# Patient Record
Sex: FEMALE | Race: WHITE | Employment: FULL TIME | URBAN - METROPOLITAN AREA
[De-identification: names, ages, dates, MRNs, and addresses within clinical notes are randomized per-mention and may not be internally consistent; named-entity substitution may affect disease eponyms.]

---

## 2017-06-16 ENCOUNTER — HOSPITAL ENCOUNTER (OUTPATIENT)
Dept: MAMMOGRAPHY | Age: 52
Discharge: HOME | End: 2017-06-16
Attending: SURGERY | Admitting: SURGERY

## 2017-06-16 DIAGNOSIS — Z12.31 ENCOUNTER FOR SCREENING MAMMOGRAM FOR MALIGNANT NEOPLASM OF BREAST: ICD-10-CM

## 2017-08-04 ENCOUNTER — HOSPITAL ENCOUNTER (OUTPATIENT)
Dept: SURGERY | Age: 52
Discharge: HOME | End: 2017-08-04
Attending: INTERNAL MEDICINE | Admitting: INTERNAL MEDICINE

## 2017-08-04 VITALS
TEMPERATURE: 97.2 F | DIASTOLIC BLOOD PRESSURE: 80 MMHG | OXYGEN SATURATION: 94 % | RESPIRATION RATE: 16 BRPM | WEIGHT: 255 LBS | HEIGHT: 65 IN | HEART RATE: 92 BPM | SYSTOLIC BLOOD PRESSURE: 122 MMHG | BODY MASS INDEX: 42.49 KG/M2

## 2017-08-04 RX ORDER — SODIUM CHLORIDE, SODIUM LACTATE, POTASSIUM CHLORIDE, CALCIUM CHLORIDE 600; 310; 30; 20 MG/100ML; MG/100ML; MG/100ML; MG/100ML
INJECTION, SOLUTION INTRAVENOUS CONTINUOUS
Status: DISCONTINUED | OUTPATIENT
Start: 2017-08-04 | End: 2017-08-05 | Stop reason: HOSPADM

## 2017-08-04 RX ORDER — LIDOCAINE HYDROCHLORIDE 10 MG/ML
0.1 INJECTION, SOLUTION EPIDURAL; INFILTRATION; INTRACAUDAL; PERINEURAL ONCE
Status: DISCONTINUED | OUTPATIENT
Start: 2017-08-04 | End: 2017-08-05 | Stop reason: HOSPADM

## 2017-08-04 RX ORDER — DOXEPIN HYDROCHLORIDE 50 MG/1
50 CAPSULE ORAL NIGHTLY
COMMUNITY

## 2017-08-04 RX ADMIN — SODIUM CHLORIDE, SODIUM LACTATE, POTASSIUM CHLORIDE, CALCIUM CHLORIDE: 600; 310; 30; 20 INJECTION, SOLUTION INTRAVENOUS at 09:21

## 2017-08-04 ASSESSMENT — PAIN - FUNCTIONAL ASSESSMENT: PAIN_FUNCTIONAL_ASSESSMENT: 0-10

## 2017-11-03 ENCOUNTER — HOSPITAL ENCOUNTER (OUTPATIENT)
Dept: OTHER | Age: 52
Discharge: HOME | End: 2017-11-03
Attending: OBSTETRICS & GYNECOLOGY | Admitting: OBSTETRICS & GYNECOLOGY

## 2017-11-03 ENCOUNTER — PAT TELEPHONE (OUTPATIENT)
Dept: PREADMISSION TESTING | Age: 52
End: 2017-11-03

## 2017-11-03 VITALS — HEIGHT: 65 IN | BODY MASS INDEX: 41.65 KG/M2 | WEIGHT: 250 LBS

## 2017-11-03 DIAGNOSIS — Z85.3 PERSONAL HISTORY OF BREAST CANCER: ICD-10-CM

## 2017-11-03 LAB
A/G RATIO: 1.2 (ref 1.1–2.2)
ALBUMIN SERPL-MCNC: 4.3 G/DL (ref 3.4–5)
ALP BLD-CCNC: 94 U/L (ref 40–129)
ALT SERPL-CCNC: 14 U/L (ref 10–40)
ANION GAP SERPL CALCULATED.3IONS-SCNC: 15 MMOL/L (ref 3–16)
AST SERPL-CCNC: 16 U/L (ref 15–37)
BASOPHILS ABSOLUTE: 0.1 K/UL (ref 0–0.2)
BASOPHILS RELATIVE PERCENT: 1 %
BILIRUB SERPL-MCNC: 0.6 MG/DL (ref 0–1)
BUN BLDV-MCNC: 15 MG/DL (ref 7–20)
CALCIUM SERPL-MCNC: 10 MG/DL (ref 8.3–10.6)
CHLORIDE BLD-SCNC: 97 MMOL/L (ref 99–110)
CO2: 26 MMOL/L (ref 21–32)
CREAT SERPL-MCNC: 1 MG/DL (ref 0.6–1.1)
EKG ATRIAL RATE: 70 BPM
EKG DIAGNOSIS: NORMAL
EKG P AXIS: 12 DEGREES
EKG P-R INTERVAL: 158 MS
EKG Q-T INTERVAL: 408 MS
EKG QRS DURATION: 128 MS
EKG QTC CALCULATION (BAZETT): 440 MS
EKG R AXIS: -32 DEGREES
EKG T AXIS: -19 DEGREES
EKG VENTRICULAR RATE: 70 BPM
EOSINOPHILS ABSOLUTE: 0.2 K/UL (ref 0–0.6)
EOSINOPHILS RELATIVE PERCENT: 1.8 %
GFR AFRICAN AMERICAN: >60
GFR NON-AFRICAN AMERICAN: 58
GLOBULIN: 3.7 G/DL
GLUCOSE BLD-MCNC: 74 MG/DL (ref 70–99)
HCT VFR BLD CALC: 40.8 % (ref 36–48)
HEMOGLOBIN: 13.5 G/DL (ref 12–16)
LYMPHOCYTES ABSOLUTE: 1.6 K/UL (ref 1–5.1)
LYMPHOCYTES RELATIVE PERCENT: 19.7 %
MCH RBC QN AUTO: 30 PG (ref 26–34)
MCHC RBC AUTO-ENTMCNC: 33 G/DL (ref 31–36)
MCV RBC AUTO: 90.7 FL (ref 80–100)
MONOCYTES ABSOLUTE: 0.6 K/UL (ref 0–1.3)
MONOCYTES RELATIVE PERCENT: 7.6 %
NEUTROPHILS ABSOLUTE: 5.8 K/UL (ref 1.7–7.7)
NEUTROPHILS RELATIVE PERCENT: 69.9 %
PDW BLD-RTO: 14.5 % (ref 12.4–15.4)
PLATELET # BLD: 214 K/UL (ref 135–450)
PMV BLD AUTO: 8.3 FL (ref 5–10.5)
POTASSIUM SERPL-SCNC: 4.2 MMOL/L (ref 3.5–5.1)
RBC # BLD: 4.5 M/UL (ref 4–5.2)
SODIUM BLD-SCNC: 138 MMOL/L (ref 136–145)
TOTAL PROTEIN: 8 G/DL (ref 6.4–8.2)
WBC # BLD: 8.3 K/UL (ref 4–11)

## 2017-11-03 PROCEDURE — 93010 ELECTROCARDIOGRAM REPORT: CPT | Performed by: INTERNAL MEDICINE

## 2017-11-09 ENCOUNTER — HOSPITAL ENCOUNTER (OUTPATIENT)
Dept: SURGERY | Age: 52
Discharge: HOME | End: 2017-11-09
Attending: OBSTETRICS & GYNECOLOGY | Admitting: OBSTETRICS & GYNECOLOGY

## 2017-11-09 VITALS
BODY MASS INDEX: 42.32 KG/M2 | SYSTOLIC BLOOD PRESSURE: 120 MMHG | WEIGHT: 254 LBS | HEIGHT: 65 IN | RESPIRATION RATE: 16 BRPM | OXYGEN SATURATION: 97 % | TEMPERATURE: 97 F | HEART RATE: 77 BPM | DIASTOLIC BLOOD PRESSURE: 63 MMHG

## 2017-11-09 LAB
GLUCOSE BLD-MCNC: 108 MG/DL (ref 70–99)
GLUCOSE BLD-MCNC: 94 MG/DL (ref 70–99)
HCT VFR BLD CALC: 36.4 % (ref 36–48)
HCT VFR BLD CALC: 38.3 % (ref 36–48)
HEMOGLOBIN: 12.1 G/DL (ref 12–16)
HEMOGLOBIN: 12.4 G/DL (ref 12–16)
PERFORMED ON: ABNORMAL
PERFORMED ON: NORMAL

## 2017-11-09 RX ORDER — LIDOCAINE HYDROCHLORIDE 10 MG/ML
1 INJECTION, SOLUTION EPIDURAL; INFILTRATION; INTRACAUDAL; PERINEURAL
Status: COMPLETED | OUTPATIENT
Start: 2017-11-09 | End: 2017-11-09

## 2017-11-09 RX ORDER — HYDROCODONE BITARTRATE AND ACETAMINOPHEN 5; 325 MG/1; MG/1
1 TABLET ORAL EVERY 6 HOURS PRN
Qty: 28 TABLET | Refills: 0 | Status: SHIPPED | OUTPATIENT
Start: 2017-11-09 | End: 2017-11-16

## 2017-11-09 RX ORDER — DIPHENHYDRAMINE HYDROCHLORIDE 50 MG/ML
12.5 INJECTION INTRAMUSCULAR; INTRAVENOUS
Status: ACTIVE | OUTPATIENT
Start: 2017-11-09 | End: 2017-11-09

## 2017-11-09 RX ORDER — HYDRALAZINE HYDROCHLORIDE 20 MG/ML
5 INJECTION INTRAMUSCULAR; INTRAVENOUS EVERY 10 MIN PRN
Status: DISCONTINUED | OUTPATIENT
Start: 2017-11-09 | End: 2017-11-10 | Stop reason: HOSPADM

## 2017-11-09 RX ORDER — SODIUM CHLORIDE, SODIUM LACTATE, POTASSIUM CHLORIDE, CALCIUM CHLORIDE 600; 310; 30; 20 MG/100ML; MG/100ML; MG/100ML; MG/100ML
INJECTION, SOLUTION INTRAVENOUS CONTINUOUS
Status: DISCONTINUED | OUTPATIENT
Start: 2017-11-09 | End: 2017-11-10 | Stop reason: HOSPADM

## 2017-11-09 RX ORDER — MEPERIDINE HYDROCHLORIDE 50 MG/ML
12.5 INJECTION INTRAMUSCULAR; INTRAVENOUS; SUBCUTANEOUS EVERY 5 MIN PRN
Status: DISCONTINUED | OUTPATIENT
Start: 2017-11-09 | End: 2017-11-10 | Stop reason: HOSPADM

## 2017-11-09 RX ORDER — SODIUM CHLORIDE 0.9 % (FLUSH) 0.9 %
10 SYRINGE (ML) INJECTION PRN
Status: DISCONTINUED | OUTPATIENT
Start: 2017-11-09 | End: 2017-11-10 | Stop reason: HOSPADM

## 2017-11-09 RX ORDER — PROMETHAZINE HYDROCHLORIDE 25 MG/ML
6.25 INJECTION, SOLUTION INTRAMUSCULAR; INTRAVENOUS
Status: ACTIVE | OUTPATIENT
Start: 2017-11-09 | End: 2017-11-09

## 2017-11-09 RX ORDER — OXYCODONE HYDROCHLORIDE 5 MG/1
5 TABLET ORAL PRN
Status: ACTIVE | OUTPATIENT
Start: 2017-11-09 | End: 2017-11-09

## 2017-11-09 RX ORDER — IBUPROFEN 800 MG/1
800 TABLET ORAL EVERY 6 HOURS PRN
Qty: 30 TABLET | Refills: 1 | Status: ON HOLD | OUTPATIENT
Start: 2017-11-09 | End: 2017-12-07 | Stop reason: DRUGHIGH

## 2017-11-09 RX ORDER — OXYCODONE HYDROCHLORIDE 5 MG/1
10 TABLET ORAL PRN
Status: ACTIVE | OUTPATIENT
Start: 2017-11-09 | End: 2017-11-09

## 2017-11-09 RX ORDER — METOCLOPRAMIDE HYDROCHLORIDE 5 MG/ML
10 INJECTION INTRAMUSCULAR; INTRAVENOUS
Status: ACTIVE | OUTPATIENT
Start: 2017-11-09 | End: 2017-11-09

## 2017-11-09 RX ORDER — SODIUM CHLORIDE 0.9 % (FLUSH) 0.9 %
10 SYRINGE (ML) INJECTION EVERY 12 HOURS SCHEDULED
Status: DISCONTINUED | OUTPATIENT
Start: 2017-11-09 | End: 2017-11-10 | Stop reason: HOSPADM

## 2017-11-09 RX ORDER — MORPHINE SULFATE 2 MG/ML
1 INJECTION, SOLUTION INTRAMUSCULAR; INTRAVENOUS EVERY 5 MIN PRN
Status: DISCONTINUED | OUTPATIENT
Start: 2017-11-09 | End: 2017-11-10 | Stop reason: HOSPADM

## 2017-11-09 RX ORDER — LABETALOL HYDROCHLORIDE 5 MG/ML
5 INJECTION, SOLUTION INTRAVENOUS EVERY 10 MIN PRN
Status: DISCONTINUED | OUTPATIENT
Start: 2017-11-09 | End: 2017-11-10 | Stop reason: HOSPADM

## 2017-11-09 RX ADMIN — SODIUM CHLORIDE, SODIUM LACTATE, POTASSIUM CHLORIDE, CALCIUM CHLORIDE: 600; 310; 30; 20 INJECTION, SOLUTION INTRAVENOUS at 12:43

## 2017-11-09 RX ADMIN — Medication 0.5 MG: at 15:34

## 2017-11-09 RX ADMIN — LIDOCAINE HYDROCHLORIDE 1 ML: 10 INJECTION, SOLUTION EPIDURAL; INFILTRATION; INTRACAUDAL; PERINEURAL at 12:43

## 2017-11-09 ASSESSMENT — PAIN SCALES - GENERAL
PAINLEVEL_OUTOF10: 7
PAINLEVEL_OUTOF10: 0

## 2017-11-09 ASSESSMENT — PAIN - FUNCTIONAL ASSESSMENT: PAIN_FUNCTIONAL_ASSESSMENT: 0-10

## 2017-11-09 NOTE — H&P
H&P reviewed and patient examined, no changes noted. I have reviewed the risks, benefits and alternatives to the procedure.   Emy Gaspar

## 2017-11-09 NOTE — PROGRESS NOTES
Findings at OR reviewed with patient in PACU.; Currently she only c/o mild abdominal cramping. No vaginal bleeding. VSS remain stable. H/H = 12.4/38.3. Will continue to observe in PACU and repeat H/H at 1800. If stable, will plan for discharge to home. If dropping, possible L/S later today. Pt will need hysterectomy in near future due to PMB bleeding, hx breast Ca, thickened EM lining, and inability to sample endometrium.     Dona Witt MD

## 2017-11-09 NOTE — OP NOTE
Department of Gynecology  Brief Operative Report        Pre-operative Diagnosis:  Postmenopausal bleeding, thickened endometrial lining, non-diagnostic endometrial biopsy    Post-operative Diagnosis:  same    Procedure: Attempted diagnostic hysteroscopy    Surgeon:  Masood Chopra      Assistant(s):      Anesthesia:  General Laryngeal Mask Airway Anesthesia    Findings:  Stenotic cervix    Estimated blood loss:  less than 50ml    Blood Transfusion?:  No     Specimens:  none    Complications:  Suspected uterine perforation    Condition:  good    See dictated operative report for full details.

## 2017-11-09 NOTE — PROGRESS NOTES
Received pt from OR awake, alert, oriented. VSS at present. Pain level minimal. Peripad in place. No drainage on peripad. Dr. Javier Stewart here. Stat H & H ordered.  Will monitor

## 2017-11-09 NOTE — ANESTHESIA PRE-OP
Department of Anesthesiology  Preprocedure Note       Name:  Salima Benavides   Age:  45 y.o.  :  1965                                          MRN:  [id number]         Date:  2017      Surgeon: Ervin Khanna    Procedure: Hysteroscopy; D&C; Endometrial Ablation    Medications prior to admission:   Prior to Admission medications    Medication Sig Start Date End Date Taking? Authorizing Provider   doxepin (SINEQUAN) 50 MG capsule Take 50 mg by mouth nightly   Yes Historical Provider, MD   carvedilol (COREG) 25 MG tablet Take 50 mg by mouth 2 times daily  2/19/15  Yes Historical Provider, MD   lisinopril (PRINIVIL;ZESTRIL) 5 MG tablet Take 5 mg by mouth. 2/6/15  Yes Historical Provider, MD   magnesium oxide (MAGOX 400) 400 (241.3 MG) MG TABS tablet Take 400 mg by mouth 2 times daily  2/6/15  Yes Historical Provider, MD   pravastatin (PRAVACHOL) 40 MG tablet Take 40 mg by mouth. 2/6/15  Yes Historical Provider, MD   furosemide (LASIX) 20 MG tablet Take 20 mg by mouth three times a week Indications: takes on mon,wed,fri    Yes Historical Provider, MD   spironolactone (ALDACTONE) 25 MG tablet Take 25 mg by mouth 16   Historical Provider, MD   CALCIUM CARBONATE PO Take by mouth daily  11/3/10   Historical Provider, MD       Current medications:    Current Outpatient Prescriptions   Medication Sig Dispense Refill    doxepin (SINEQUAN) 50 MG capsule Take 50 mg by mouth nightly      carvedilol (COREG) 25 MG tablet Take 50 mg by mouth 2 times daily       lisinopril (PRINIVIL;ZESTRIL) 5 MG tablet Take 5 mg by mouth.  magnesium oxide (MAGOX 400) 400 (241.3 MG) MG TABS tablet Take 400 mg by mouth 2 times daily       pravastatin (PRAVACHOL) 40 MG tablet Take 40 mg by mouth.       furosemide (LASIX) 20 MG tablet Take 20 mg by mouth three times a week Indications: takes on mon,wed,fri       spironolactone (ALDACTONE) 25 MG tablet Take 25 mg by mouth      CALCIUM CARBONATE PO Take by mouth daily        Current Facility-Administered Medications   Medication Dose Route Frequency Provider Last Rate Last Dose    lactated ringers infusion   Intravenous Continuous Daryl Tracey  mL/hr at 17 1243      sodium chloride flush 0.9 % injection 10 mL  10 mL Intravenous 2 times per day Daryl Tracey MD        sodium chloride flush 0.9 % injection 10 mL  10 mL Intravenous PRN Daryl Tracey MD        ceFAZolin (ANCEF) 2 g in sterile water 20 mL IV syringe  2 g Intravenous On Call to [address] [place] [address], MD           Allergies:     Allergies   Allergen Reactions    Azithromycin Other (See Comments)     \"BP dropped\"       Problem List:    Patient Active Problem List   Diagnosis Code    Personal history of breast cancer Z85.3    History of antineoplastic chemotherapy Z92.21    Cardiomyopathy due to chemotherapy ([facility] I42.7, T45.1X5A    Family history of breast cancer Z80.3    Family history of ovarian cancer Z80.41    Knee strain S86.919A    Right knee pain M25.561       Past Medical History:        Diagnosis Date    Allergic     Arthritis     Breast cancer ([facility] [address]     , Radiation, Chemo    CAD (coronary artery disease)     cardiomyopathy    CHF (congestive heart failure) ([facility] [facility]     Diabetes mellitus ([facility] [address] [facility]     No medications    Heart disease     Hx of blood clots     blood clot at PICC line site    PONV (postoperative nausea and vomiting)     nausea; no vomiting       Past Surgical History:        Procedure Laterality Date    BREAST LUMPECTOMY Left     lymph node dissection     CARDIAC DEFIBRILLATOR PLACEMENT Left      SECTION      COLONOSCOPY  2017    LYMPH NODE DISSECTION         Social History:    Social History   Substance Use Topics    Smoking status: Never Smoker    Smokeless tobacco: Never Used    Alcohol use 1.2 oz/week     2 Glasses of wine per week      Comment: 2-3 drinks per week                                Counseling given: Not

## 2017-11-10 NOTE — PROGRESS NOTES
Dr Michael Cook called and updated about repeat H&H; states okay for pt to go home and he will followup tomorrow

## 2017-11-10 NOTE — OP NOTE
[facility]                   [facility]                                  OPERATIVE REPORT    PATIENT NAME: Wing Stoddard                       :        1965  MED REC NO:   [id number]                          ROOM:  ACCOUNT NO:   [de-identified]                          ADMIT DATE: 2017  PROVIDER:     Marshall Leiva MD    DATE OF PROCEDURE:  2017    PROCEDURE:  Attempted diagnostic hysteroscopy. PREOPERATIVE DIAGNOSES:  Postmenopausal bleeding, thickened endometrial  lining and nondiagnostic endometrial biopsy. POSTOPERATIVE DIAGNOSES:  Postmenopausal bleeding, thickened endometrial  lining and nondiagnostic endometrial biopsy. SURGEON:  Marshall Leiva MD    ANESTHESIA:  General with LMA. INDICATIONS AND CONSENT FOR THE PROCEDURE:  The patient is a 66-year-old   2, para 2 female, who presented to the office in 10/2017 for her  annual examination, complaining of bleeding for approximately one week that  started on 2017. Prior to that, her last period was 14 years ago. She does have a history of breast cancer and has a history of lumpectomy in  . She has chronic hypertension and type 2 diabetes as well. The  patient's breast cancer history, she was treated with a chemotherapy,  radiation therapy and lumpectomy, her tumor was a triple negative tumor. The patient was at risk for endometrial pathology, she had a Pap smear  done, which was normal.  She had an ultrasound done which showed  nonvisualization of the ovaries; however, the endometrial lining appeared  to be thickened at 6.4 mm without any evidence of a focal lesion. The  patient had an attempted endometrial biopsy performed; however, she had a  large amount of cervical stenosis, a specimen was obtained and just  returned showing mucus and fragments of benign endocervical glands, but no  endometrial tissue.   The patient was counseled as to need for operative  management and diagnostic hysteroscopy with dilation and curettage. She  was aware of risks of surgery including anesthesia, infection, injury to  bowel or urinary tract, hemorrhage requiring transfusion and risk of HIV or  hepatitis and possible need for further surgery. She understood these  risks and signed an informed consent. DESCRIPTION OF PROCEDURE:  With the patient under satisfactory general  anesthesia, she was placed in the dorsal lithotomy position in the  candy-cane stirrups. A careful examination under anesthesia was performed  revealing no abnormalities. The vagina and perineum were prepped and  draped in usual sterile fashion and a catheter was used to empty the  bladder. Alegria retractors were placed anteriorly and posteriorly in the  vaginal vault and the cervix was visualized with some difficulty due to the  cervix being essentially flushed with the upper vagina. Once the cervix  was visualized, it was grasped with an Allis clamp and then using an  uterine sound, attempts were made to sound the uterus. The sound went in  approximately 5 cm and then resistance was met. At this point, using Hegar  dilators, the cervix was dilated and then using the hysteroscope attempts  were made hydrodissection to go past the cervical stenosis and get into the  uterine cavity. No uterine cavity was identified; however, it appeared  that the uterine cavity was just beyond the reach of the hysteroscope. Further dilation was done with the dilators and then the hysteroscope was  reinserted and at this point, there was less resistance to entering and the  hysteroscope was advanced. However, at this point there was a loss of  fluid pressure based on the attached fluid recovery system and the  hysteroscope visualized that looked to be contents of the peritoneal  cavity.   The hysteroscope was immediately withdrawn and it was immediately  apparent that there was a perforation to the posterior wall of the uterus. The area was observed for approximately 10 minutes in the OR and there was  no bleeding noted from this. At this point, the procedure was terminated. The Allis clamp was removed and there was just a small amount of bleeding  from the Allis clamp site. This was controlled with direct pressure. The  patient's vital signs were stable in the OR. At this point, the patient  was then awakened and taken to recovery room in satisfactory condition. All sponge counts were correct and no complications. Estimated blood loss  was less than 50 mL and fluid replacement was with crystalloid. There was  no fluid deficit of any significance. The patient will be observed in the  recovery room on same day surgery, stat hemoglobin and hematocrit will be  obtained on arrival to the recovery room and then will be repeated three  hours later. If it remains stable, the patient will be discharged to  followup closely in the office with signs and symptoms to review for  infection. If the patient's hemoglobin and hematocrit are dropping, then  consideration will be made to return to the OR later today to explore this  for a vascular injury, although this is not suspected at the time of the  completion of procedure.         Sharri Franco MD    D: 11/09/2017 15:16:03       T: 11/09/2017 21:24:44     [id number]  Job#: [id number]     Doc#: [id number]

## 2017-11-10 NOTE — PROGRESS NOTES
Discharge instructions reviewed with patient/responsible adult and understanding verbalized. Discharge instructions signed and copies given. Patient discharged home with belongings. Rx for Norco and Ibuprofen given.  WC to car

## 2017-12-07 PROBLEM — N95.0 POSTMENOPAUSAL BLEEDING: Status: ACTIVE | Noted: 2017-12-07

## 2017-12-09 PROBLEM — N95.0 POSTMENOPAUSAL BLEEDING: Status: RESOLVED | Noted: 2017-12-07 | Resolved: 2017-12-09

## 2018-10-08 ENCOUNTER — HOSPITAL ENCOUNTER (OUTPATIENT)
Dept: WOMENS IMAGING | Age: 53
Discharge: HOME | End: 2018-10-08
Payer: COMMERCIAL

## 2018-10-08 DIAGNOSIS — Z78.0 POSTMENOPAUSAL STATE: ICD-10-CM

## 2018-10-08 PROCEDURE — 77080 DXA BONE DENSITY AXIAL: CPT

## 2019-08-16 ENCOUNTER — HOSPITAL ENCOUNTER (OUTPATIENT)
Dept: WOMENS IMAGING | Age: 54
Discharge: HOME | End: 2019-08-16
Payer: COMMERCIAL

## 2019-08-16 DIAGNOSIS — Z12.31 ENCOUNTER FOR SCREENING MAMMOGRAM FOR MALIGNANT NEOPLASM OF BREAST: ICD-10-CM

## 2019-08-16 DIAGNOSIS — N64.59 BLEEDING FROM NIPPLE IN FEMALE: ICD-10-CM

## 2019-08-16 PROCEDURE — 77067 SCR MAMMO BI INCL CAD: CPT

## 2019-08-16 PROCEDURE — 76642 ULTRASOUND BREAST LIMITED: CPT

## 2020-08-21 ENCOUNTER — HOSPITAL ENCOUNTER (OUTPATIENT)
Dept: WOMENS IMAGING | Age: 55
Discharge: HOME | End: 2020-08-21
Payer: COMMERCIAL

## 2020-08-21 PROCEDURE — 76642 ULTRASOUND BREAST LIMITED: CPT

## 2020-08-21 PROCEDURE — G0279 TOMOSYNTHESIS, MAMMO: HCPCS

## 2020-09-01 ENCOUNTER — HOSPITAL ENCOUNTER (OUTPATIENT)
Dept: NUCLEAR MEDICINE | Age: 55
Discharge: HOME | End: 2020-09-01
Payer: COMMERCIAL

## 2020-09-01 ENCOUNTER — HOSPITAL ENCOUNTER (OUTPATIENT)
Dept: CT IMAGING | Age: 55
Discharge: HOME | End: 2020-09-01
Payer: COMMERCIAL

## 2020-09-01 ENCOUNTER — HOSPITAL ENCOUNTER (OUTPATIENT)
Age: 55
Discharge: HOME | End: 2020-09-01
Payer: COMMERCIAL

## 2020-09-01 PROCEDURE — A9503 TC99M MEDRONATE: HCPCS | Performed by: INTERNAL MEDICINE

## 2020-09-01 PROCEDURE — 3430000000 HC RX DIAGNOSTIC RADIOPHARMACEUTICAL: Performed by: INTERNAL MEDICINE

## 2020-09-01 PROCEDURE — 74160 CT ABDOMEN W/CONTRAST: CPT

## 2020-09-01 PROCEDURE — 71260 CT THORAX DX C+: CPT

## 2020-09-01 PROCEDURE — 6360000004 HC RX CONTRAST MEDICATION: Performed by: INTERNAL MEDICINE

## 2020-09-01 PROCEDURE — 78306 BONE IMAGING WHOLE BODY: CPT

## 2020-09-01 RX ORDER — TC 99M MEDRONATE 20 MG/10ML
26 INJECTION, POWDER, LYOPHILIZED, FOR SOLUTION INTRAVENOUS
Status: COMPLETED | OUTPATIENT
Start: 2020-09-01 | End: 2020-09-01

## 2020-09-01 RX ADMIN — TC 99M MEDRONATE 26 MILLICURIE: 20 INJECTION, POWDER, LYOPHILIZED, FOR SOLUTION INTRAVENOUS at 08:41

## 2020-09-01 RX ADMIN — IOHEXOL 50 ML: 240 INJECTION, SOLUTION INTRATHECAL; INTRAVASCULAR; INTRAVENOUS; ORAL at 09:49

## 2020-09-01 RX ADMIN — IOPAMIDOL 100 ML: 755 INJECTION, SOLUTION INTRAVENOUS at 09:49

## 2020-09-16 ENCOUNTER — NURSE ONLY (OUTPATIENT)
Dept: PRIMARY CARE CLINIC | Age: 55
End: 2020-09-16
Payer: COMMERCIAL

## 2020-09-16 PROCEDURE — 99211 OFF/OP EST MAY X REQ PHY/QHP: CPT | Performed by: NURSE PRACTITIONER

## 2020-09-16 NOTE — PROGRESS NOTES
[facility] / [facility] ([facility]) [address] [facility], [address]    Acknowledgment of Informed Consent for Surgical or Medical Procedure and Sedation  I agree to allow doctor(s) KIMBERLY SERNA and his/her associates or assistants, including residents and/or other qualified medical practitioner to perform the following medical treatment or procedure and to administer or direct the administration of sedation as necessary:  Procedure(s): RIGHT PORT PLACEMENT  My doctor has explained the following regarding the proposed procedure:   the explanation of the procedure   the benefits of the procedure   the potential problems that might occur during recuperation   the risks and side effects of the procedure which could include but are not limited to severe blood loss, infection, stroke or death   the benefits, risks and side effect of alternative procedures including the consequences of declining this procedure or any alternative procedures   the likelihood of achieving satisfactory results. I acknowledge no guarantee or assurance has been made to me regarding the results. I understand that during the course of this treatment/procedure, unforeseen conditions can occur which require an additional or different procedure. I agree to allow my physician or assistants to perform such extension of the original procedure as they may find necessary. I understand that sedation will often result in temporary impairment of memory and fine motor skills and that sedation can occasionally progress to a state of deep sedation or general anesthesia. I understand the risks of anesthesia for surgery include, but are not limited to, sore throat, hoarseness, injury to face, mouth, or teeth; nausea; headache; injury to blood vessels or nerves; death, brain damage, or paralysis.     I understand that if I have a Limitation of Treatment order in effect during my hospitalization, the order may or may not be in effect during this procedure. I give my doctor permission to give me blood or blood products. I understand that there are risks with receiving blood such as hepatitis, AIDS, fever, or allergic reaction. I acknowledge that the risks, benefits, and alternatives of this treatment have been explained to me and that no express or implied warranty has been given by the hospital, any blood bank, or any person or entity as to the blood or blood components transfused. At the discretion of my doctor, I agree to allow observers, equipment/product representatives and allow photographing, and/or televising of the procedure, provided my name or identity is maintained confidentially. I agree the hospital may dispose of or use for scientific or educational purposes any tissue, fluid, or body parts which may be removed.     ________________________________Date________Time______ am/pm  ([place])  Patient or Signature of Closest Relative or Legal Guardian    ________________________________Date________Time______am/pm      Page 1 of  1  Witness

## 2020-09-17 LAB — SARS-COV-2, NAA: NOT DETECTED

## 2020-09-19 PROBLEM — I87.8 POOR VENOUS ACCESS: Status: ACTIVE | Noted: 2020-09-19

## 2020-09-21 ENCOUNTER — ANESTHESIA EVENT (OUTPATIENT)
Dept: OPERATING ROOM | Age: 55
End: 2020-09-21
Payer: COMMERCIAL

## 2020-09-21 NOTE — PROGRESS NOTES

## 2020-09-22 ENCOUNTER — APPOINTMENT (OUTPATIENT)
Dept: GENERAL RADIOLOGY | Age: 55
End: 2020-09-22
Attending: SURGERY
Payer: COMMERCIAL

## 2020-09-22 ENCOUNTER — ANESTHESIA (OUTPATIENT)
Dept: OPERATING ROOM | Age: 55
End: 2020-09-22
Payer: COMMERCIAL

## 2020-09-22 ENCOUNTER — HOSPITAL ENCOUNTER (OUTPATIENT)
Age: 55
Setting detail: OUTPATIENT SURGERY
Discharge: HOME | End: 2020-09-22
Attending: SURGERY | Admitting: SURGERY
Payer: COMMERCIAL

## 2020-09-22 VITALS
TEMPERATURE: 98.6 F | OXYGEN SATURATION: 90 % | DIASTOLIC BLOOD PRESSURE: 62 MMHG | SYSTOLIC BLOOD PRESSURE: 110 MMHG | RESPIRATION RATE: 20 BRPM

## 2020-09-22 VITALS
HEART RATE: 87 BPM | SYSTOLIC BLOOD PRESSURE: 126 MMHG | RESPIRATION RATE: 17 BRPM | WEIGHT: 250 LBS | DIASTOLIC BLOOD PRESSURE: 72 MMHG | BODY MASS INDEX: 41.65 KG/M2 | HEIGHT: 65 IN | TEMPERATURE: 97 F | OXYGEN SATURATION: 100 %

## 2020-09-22 LAB
GLUCOSE BLD-MCNC: 115 MG/DL (ref 70–99)
GLUCOSE BLD-MCNC: 167 MG/DL (ref 70–99)
PERFORMED ON: ABNORMAL
PERFORMED ON: ABNORMAL

## 2020-09-22 PROCEDURE — 6360000002 HC RX W HCPCS: Performed by: SURGERY

## 2020-09-22 PROCEDURE — 6360000002 HC RX W HCPCS

## 2020-09-22 PROCEDURE — 7100000001 HC PACU RECOVERY - ADDTL 15 MIN: Performed by: SURGERY

## 2020-09-22 PROCEDURE — 3600000013 HC SURGERY LEVEL 3 ADDTL 15MIN: Performed by: SURGERY

## 2020-09-22 PROCEDURE — 3700000000 HC ANESTHESIA ATTENDED CARE: Performed by: SURGERY

## 2020-09-22 PROCEDURE — 71045 X-RAY EXAM CHEST 1 VIEW: CPT

## 2020-09-22 PROCEDURE — 2709999900 HC NON-CHARGEABLE SUPPLY: Performed by: SURGERY

## 2020-09-22 PROCEDURE — 3600000003 HC SURGERY LEVEL 3 BASE: Performed by: SURGERY

## 2020-09-22 PROCEDURE — 7100000011 HC PHASE II RECOVERY - ADDTL 15 MIN: Performed by: SURGERY

## 2020-09-22 PROCEDURE — 2580000003 HC RX 258: Performed by: NURSE ANESTHETIST, CERTIFIED REGISTERED

## 2020-09-22 PROCEDURE — 7100000010 HC PHASE II RECOVERY - FIRST 15 MIN: Performed by: SURGERY

## 2020-09-22 PROCEDURE — 2580000003 HC RX 258: Performed by: ANESTHESIOLOGY

## 2020-09-22 PROCEDURE — 2500000003 HC RX 250 WO HCPCS

## 2020-09-22 PROCEDURE — 7100000000 HC PACU RECOVERY - FIRST 15 MIN: Performed by: SURGERY

## 2020-09-22 PROCEDURE — 2580000003 HC RX 258: Performed by: SURGERY

## 2020-09-22 PROCEDURE — 77001 FLUOROGUIDE FOR VEIN DEVICE: CPT

## 2020-09-22 PROCEDURE — 2500000003 HC RX 250 WO HCPCS: Performed by: SURGERY

## 2020-09-22 PROCEDURE — 3700000001 HC ADD 15 MINUTES (ANESTHESIA): Performed by: SURGERY

## 2020-09-22 PROCEDURE — C1788 PORT, INDWELLING, IMP: HCPCS | Performed by: SURGERY

## 2020-09-22 DEVICE — PORT INFUS SGL LUMN ATTCH POLYUR OPN END CATH 8FR POWERPRT: Type: IMPLANTABLE DEVICE | Site: CHEST  WALL | Status: FUNCTIONAL

## 2020-09-22 RX ORDER — SODIUM CHLORIDE, SODIUM LACTATE, POTASSIUM CHLORIDE, CALCIUM CHLORIDE 600; 310; 30; 20 MG/100ML; MG/100ML; MG/100ML; MG/100ML
INJECTION, SOLUTION INTRAVENOUS CONTINUOUS
Status: DISCONTINUED | OUTPATIENT
Start: 2020-09-22 | End: 2020-09-22 | Stop reason: HOSPADM

## 2020-09-22 RX ORDER — ONDANSETRON 2 MG/ML
INJECTION INTRAMUSCULAR; INTRAVENOUS PRN
Status: DISCONTINUED | OUTPATIENT
Start: 2020-09-22 | End: 2020-09-22 | Stop reason: SDUPTHER

## 2020-09-22 RX ORDER — LIDOCAINE HYDROCHLORIDE 20 MG/ML
INJECTION, SOLUTION INFILTRATION; PERINEURAL PRN
Status: DISCONTINUED | OUTPATIENT
Start: 2020-09-22 | End: 2020-09-22 | Stop reason: SDUPTHER

## 2020-09-22 RX ORDER — ONDANSETRON 2 MG/ML
4 INJECTION INTRAMUSCULAR; INTRAVENOUS
Status: DISCONTINUED | OUTPATIENT
Start: 2020-09-22 | End: 2020-09-22 | Stop reason: HOSPADM

## 2020-09-22 RX ORDER — ENALAPRILAT 2.5 MG/2ML
1.25 INJECTION INTRAVENOUS
Status: DISCONTINUED | OUTPATIENT
Start: 2020-09-22 | End: 2020-09-22 | Stop reason: HOSPADM

## 2020-09-22 RX ORDER — SODIUM CHLORIDE 0.9 % (FLUSH) 0.9 %
SYRINGE (ML) INJECTION PRN
Status: DISCONTINUED | OUTPATIENT
Start: 2020-09-22 | End: 2020-09-22 | Stop reason: ALTCHOICE

## 2020-09-22 RX ORDER — FENTANYL CITRATE 50 UG/ML
50 INJECTION, SOLUTION INTRAMUSCULAR; INTRAVENOUS EVERY 5 MIN PRN
Status: DISCONTINUED | OUTPATIENT
Start: 2020-09-22 | End: 2020-09-22 | Stop reason: HOSPADM

## 2020-09-22 RX ORDER — PROPOFOL 10 MG/ML
INJECTION, EMULSION INTRAVENOUS CONTINUOUS PRN
Status: DISCONTINUED | OUTPATIENT
Start: 2020-09-22 | End: 2020-09-22 | Stop reason: SDUPTHER

## 2020-09-22 RX ORDER — LABETALOL 20 MG/4 ML (5 MG/ML) INTRAVENOUS SYRINGE
5 EVERY 10 MIN PRN
Status: DISCONTINUED | OUTPATIENT
Start: 2020-09-22 | End: 2020-09-22 | Stop reason: HOSPADM

## 2020-09-22 RX ORDER — HYDROCODONE BITARTRATE AND ACETAMINOPHEN 5; 325 MG/1; MG/1
1 TABLET ORAL EVERY 6 HOURS PRN
Qty: 12 TABLET | Refills: 0 | Status: SHIPPED | OUTPATIENT
Start: 2020-09-22 | End: 2020-09-25

## 2020-09-22 RX ORDER — FENTANYL CITRATE 50 UG/ML
25 INJECTION, SOLUTION INTRAMUSCULAR; INTRAVENOUS EVERY 5 MIN PRN
Status: DISCONTINUED | OUTPATIENT
Start: 2020-09-22 | End: 2020-09-22 | Stop reason: HOSPADM

## 2020-09-22 RX ORDER — LIDOCAINE HYDROCHLORIDE 10 MG/ML
1 INJECTION, SOLUTION EPIDURAL; INFILTRATION; INTRACAUDAL; PERINEURAL
Status: DISCONTINUED | OUTPATIENT
Start: 2020-09-22 | End: 2020-09-22 | Stop reason: HOSPADM

## 2020-09-22 RX ORDER — SODIUM CHLORIDE, SODIUM LACTATE, POTASSIUM CHLORIDE, CALCIUM CHLORIDE 600; 310; 30; 20 MG/100ML; MG/100ML; MG/100ML; MG/100ML
INJECTION, SOLUTION INTRAVENOUS ONCE
Status: DISCONTINUED | OUTPATIENT
Start: 2020-09-22 | End: 2020-09-22 | Stop reason: HOSPADM

## 2020-09-22 RX ORDER — HYDRALAZINE HYDROCHLORIDE 20 MG/ML
5 INJECTION INTRAMUSCULAR; INTRAVENOUS EVERY 5 MIN PRN
Status: DISCONTINUED | OUTPATIENT
Start: 2020-09-22 | End: 2020-09-22 | Stop reason: HOSPADM

## 2020-09-22 RX ORDER — ANASTROZOLE 1 MG/1
1 TABLET ORAL DAILY
COMMUNITY

## 2020-09-22 RX ORDER — SODIUM CHLORIDE, SODIUM LACTATE, POTASSIUM CHLORIDE, CALCIUM CHLORIDE 600; 310; 30; 20 MG/100ML; MG/100ML; MG/100ML; MG/100ML
INJECTION, SOLUTION INTRAVENOUS CONTINUOUS PRN
Status: DISCONTINUED | OUTPATIENT
Start: 2020-09-22 | End: 2020-09-22 | Stop reason: SDUPTHER

## 2020-09-22 RX ORDER — DEXAMETHASONE SODIUM PHOSPHATE 4 MG/ML
INJECTION, SOLUTION INTRA-ARTICULAR; INTRALESIONAL; INTRAMUSCULAR; INTRAVENOUS; SOFT TISSUE PRN
Status: DISCONTINUED | OUTPATIENT
Start: 2020-09-22 | End: 2020-09-22 | Stop reason: SDUPTHER

## 2020-09-22 RX ORDER — SODIUM CHLORIDE 0.9 % (FLUSH) 0.9 %
10 SYRINGE (ML) INJECTION PRN
Status: DISCONTINUED | OUTPATIENT
Start: 2020-09-22 | End: 2020-09-22 | Stop reason: HOSPADM

## 2020-09-22 RX ORDER — MAGNESIUM HYDROXIDE 1200 MG/15ML
LIQUID ORAL CONTINUOUS PRN
Status: COMPLETED | OUTPATIENT
Start: 2020-09-22 | End: 2020-09-22

## 2020-09-22 RX ORDER — PALBOCICLIB 125 MG/1
125 CAPSULE ORAL DAILY
COMMUNITY

## 2020-09-22 RX ORDER — HEPARIN SODIUM (PORCINE) LOCK FLUSH IV SOLN 100 UNIT/ML 100 UNIT/ML
SOLUTION INTRAVENOUS PRN
Status: DISCONTINUED | OUTPATIENT
Start: 2020-09-22 | End: 2020-09-22 | Stop reason: ALTCHOICE

## 2020-09-22 RX ORDER — SODIUM CHLORIDE 0.9 % (FLUSH) 0.9 %
10 SYRINGE (ML) INJECTION EVERY 12 HOURS SCHEDULED
Status: DISCONTINUED | OUTPATIENT
Start: 2020-09-22 | End: 2020-09-22 | Stop reason: HOSPADM

## 2020-09-22 RX ADMIN — SODIUM CHLORIDE, SODIUM LACTATE, POTASSIUM CHLORIDE, AND CALCIUM CHLORIDE: 600; 310; 30; 20 INJECTION, SOLUTION INTRAVENOUS at 11:22

## 2020-09-22 RX ADMIN — SODIUM CHLORIDE, POTASSIUM CHLORIDE, SODIUM LACTATE AND CALCIUM CHLORIDE: 600; 310; 30; 20 INJECTION, SOLUTION INTRAVENOUS at 10:14

## 2020-09-22 RX ADMIN — PROPOFOL 100 MCG/KG/MIN: 10 INJECTION, EMULSION INTRAVENOUS at 12:01

## 2020-09-22 RX ADMIN — DEXAMETHASONE SODIUM PHOSPHATE 8 MG: 4 INJECTION, SOLUTION INTRAMUSCULAR; INTRAVENOUS at 12:01

## 2020-09-22 RX ADMIN — LIDOCAINE HYDROCHLORIDE 100 MG: 20 INJECTION, SOLUTION INFILTRATION; PERINEURAL at 12:01

## 2020-09-22 RX ADMIN — ONDANSETRON 4 MG: 2 INJECTION INTRAMUSCULAR; INTRAVENOUS at 12:01

## 2020-09-22 ASSESSMENT — PULMONARY FUNCTION TESTS
PIF_VALUE: 0
PIF_VALUE: 1
PIF_VALUE: 0
PIF_VALUE: 1
PIF_VALUE: 0
PIF_VALUE: 1
PIF_VALUE: 0
PIF_VALUE: 1
PIF_VALUE: 1
PIF_VALUE: 0
PIF_VALUE: 1
PIF_VALUE: 0
PIF_VALUE: 1
PIF_VALUE: 0
PIF_VALUE: 1
PIF_VALUE: 1
PIF_VALUE: 0

## 2020-09-22 ASSESSMENT — PAIN SCALES - GENERAL
PAINLEVEL_OUTOF10: 2
PAINLEVEL_OUTOF10: 4
PAINLEVEL_OUTOF10: 0

## 2020-09-22 ASSESSMENT — PAIN - FUNCTIONAL ASSESSMENT: PAIN_FUNCTIONAL_ASSESSMENT: 0-10

## 2020-09-22 NOTE — BRIEF OP NOTE
Brief Postoperative Note      Patient: Mychal Quintanilla  YOB: 1965  MRN: [id number]    Date of Procedure: 9/22/2020    Pre-Op Diagnosis: Poor venous access, bilateral breast cancer    Post-Op Diagnosis: Same       Procedure(s):  RIGHT PORT PLACEMENT, SUBCLAVIAN    Surgeon(s):  Mallorie Barton MD    Assistant:  Surgical Assistant: Radha Romero    Anesthesia: Monitor Anesthesia Care    Estimated Blood Loss (mL): less than 50     Complications: None    Specimens:   * No specimens in log *    Implants:  * No implants in log *      Drains:   [REMOVED] Urethral Catheter Latex 16 fr (Removed)       Findings: TIP IN SVC    Electronically signed by Mallorie Barton MD on 9/22/2020 at 12:58 PM

## 2020-09-22 NOTE — H&P
Arturo     [id number]    [facility] Same Day Surgery Update H & P  Department of General Surgery   Surgical Service   Pre-operative History and Physical  Last H & P within the last 30 days. DIAGNOSIS:   Poor venous access    Procedure(s):  RIGHT PORT PLACEMENT     HISTORY OF PRESENT ILLNESS:   Patient is a morbidly obese (Body mass index is 41.6 kg/m².), 60 y.o. female with infiltrating ductal carcinoma of the breasts presents today for port placement in anticipation of planned treatment. Covid 19:  Patient denies fever, chills, cough or known exposure to Covid-19.   Patient reports they have been quarantined at home since Covid-19 test.      Past Medical History:        Diagnosis Date    Allergic     Arthritis     Breast cancer ([address]     , Radiation, Chemo    CAD (coronary artery disease)     cardiomyopathy    CHF (congestive heart failure) ([address]     Diabetes mellitus ([address]     No medications    Heart disease     Hx of blood clots     blood clot at PICC line site    ICD (implantable cardioverter-defibrillator) in place     Metastatic disease ([address]     Mitral regurgitation     Nonischemic cardiomyopathy ([address]     RADHA (obstructive sleep apnea)     no cpap, had several years ago but didn't use    PONV (postoperative nausea and vomiting)     nausea; no vomiting     Past Surgical History:        Procedure Laterality Date    BREAST LUMPECTOMY Left     lymph node dissection     CARDIAC DEFIBRILLATOR PLACEMENT Left      SECTION      COLONOSCOPY  2017    DILATION AND CURETTAGE OF UTERUS  2011    HYSTERECTOMY  2017    TOTAL ABDOMINAL HYSTERECTOMY, BILATERAL SALPINGO-OOPHORECTOMY LYSIS OF ADHESIONS    LYMPH NODE DISSECTION      OTHER SURGICAL HISTORY N/A 2017    attempted diagnostic hysteroscopy     Past Social History:  Social History     Socioeconomic History    Marital status: Single     Spouse name: Not on file    Number of children: Not on Provider, MD   lisinopril (PRINIVIL;ZESTRIL) 5 MG tablet Take 5 mg by mouth. 2/6/15   Historical Provider, MD   magnesium oxide (MAGOX 400) 400 (241.3 MG) MG TABS tablet Take 400 mg by mouth 2 times daily  2/6/15   Historical Provider, MD   pravastatin (PRAVACHOL) 40 MG tablet Take 40 mg by mouth. 2/6/15   Historical Provider, MD   furosemide (LASIX) 20 MG tablet Take 20 mg by mouth three times a week Indications: takes on mon,wed,fri     Historical Provider, MD         Allergies:  Azithromycin    PHYSICAL EXAM:      /77   Pulse 91   Temp 97.7 °F (36.5 °C) (Oral)   Resp 18   Ht 5' 5\" (1.651 m)   Wt 250 lb (113.4 kg)   SpO2 95%   BMI 41.60 kg/m²      Airway:  Airway patent with no audible stridor    Heart:  regular rate and rhythm, No murmur noted    Lungs:  No increased work of breathing, good air exchange, clear to auscultation bilaterally, no crackles or wheezing    Abdomen:  soft, non-distended, non-tender, no rebound tenderness or guarding, normal active bowel sounds and no masses palpated    ASSESSMENT AND PLAN     Patient is a 60 y.o. female with above specified procedure planned. 1.  Patient seen and focused exam done today- no new changes since last physical exam on 8/31/20    2. Access to ancillary services are available per request of the provider.     KRIS Baptiste - CNP     9/22/2020

## 2020-09-22 NOTE — ANESTHESIA POSTPROCEDURE EVALUATION
Department of Anesthesiology  Postprocedure Note    Patient: Jef Beckford  MRN: [id number]  YOB: 1965  Date of evaluation: 9/22/2020  Time:  12:58 PM     Procedure Summary     Date:  09/22/20 Room / Location:  [address] [place] [address] / [facility]    Anesthesia Start:  9482 Anesthesia Stop:  1244    Procedure:  RIGHT PORT PLACEMENT (Right ) Diagnosis:  (Poor venous access)    Surgeon:  Edwar Watson MD Responsible Provider:  Brooke Ware MD    Anesthesia Type:  general, MAC ASA Status:  3          Anesthesia Type: general, MAC    Carmen Phase I: Carmen Score: 10    Carmen Phase II:      Last vitals: Reviewed and per EMR flowsheets.        Anesthesia Post Evaluation    Patient location during evaluation: PACU  Patient participation: complete - patient participated  Level of consciousness: awake  Airway patency: patent  Nausea & Vomiting: no nausea and no vomiting  Complications: no  Cardiovascular status: hemodynamically stable  Respiratory status: acceptable  Hydration status: stable

## 2020-09-22 NOTE — PROGRESS NOTES
Ambulatory Surgery/Procedure Discharge Note    Vitals:    09/22/20 1410   BP: 126/72   Pulse: 87   Resp: 17   Temp: 97 °F (36.1 °C)   SpO2: 100%     Patient meets discharge criteria based on Carmen score. In: 1022 [P.O.:222; I.V.:800]  Out: -     Restroom use offered before discharge. Yes    Pain assessment:  none  Pain Level: 0    Discharge instructions reviewed with friend Shaila in the [address] [place] [address]. Dressing with surgical glue is clean, dry and intact. Patient states she is ready for discharge. Patient discharged to home/self care.  Patient discharged via wheel chair by transporter to waiting family/S.O.       9/22/2020 2:15 PM

## 2020-09-22 NOTE — PROGRESS NOTES
Patient to pacu 12 s/p R port placement. Report received from CRNA, reported hemodynamically stable intra op. Patient denies pain at this time.  Room air spo2 97%

## 2020-09-22 NOTE — PROGRESS NOTES
PACU Transfer to [facility]    Vitals:    09/22/20 1351   BP: 121/73   Pulse: 83   Resp: 12   Temp: 97.2 °F (36.2 °C)   SpO2: 97%         Intake/Output Summary (Last 24 hours) at 9/22/2020 1353  Last data filed at 9/22/2020 1353  Gross per 24 hour   Intake 800 ml   Output --   Net 800 ml       Pain assessment:  level of pain (1-10, 10 severe),   Pain Level: 2    Patient transferred to care of LYUBOV RN.    9/22/2020 1:53 PM

## 2020-09-22 NOTE — ANESTHESIA PRE PROCEDURE
Department of Anesthesiology  Preprocedure Note       Name:  Evelina Tan   Age:  60 y.o.  :  1965                                          MRN:  [id number]         Date:  2020      Surgeon: Chioma Irving):  Maddison Gonzáles MD    Procedure: Procedure(s):  RIGHT PORT PLACEMENT    Medications prior to admission:   Prior to Admission medications    Medication Sig Start Date End Date Taking? Authorizing Provider   palbociclib (IBRANCE) 125 MG capsule Take 125 mg by mouth daily   Yes Historical Provider, MD   anastrozole (ARIMIDEX) 1 MG tablet Take 1 mg by mouth daily   Yes Historical Provider, MD   metFORMIN (GLUCOPHAGE) 500 MG tablet Take 500 mg by mouth 2 times daily (with meals) 2 tabs each dose   Yes Historical Provider, MD   Dulaglutide 0.75 MG/0.5ML SOPN Inject 0.75 mg into the skin once a week   Yes Historical Provider, MD   spironolactone (ALDACTONE) 25 MG tablet Take 25 mg by mouth 2 times daily   Yes Historical Provider, MD   ibuprofen (ADVIL;MOTRIN) 200 MG tablet Take 400 mg by mouth every evening   Yes Historical Provider, MD   doxepin (SINEQUAN) 50 MG capsule Take 50 mg by mouth nightly   Yes Historical Provider, MD   carvedilol (COREG) 25 MG tablet Take 50 mg by mouth 2 times daily  2/19/15  Yes Historical Provider, MD   lisinopril (PRINIVIL;ZESTRIL) 5 MG tablet Take 5 mg by mouth. 2/6/15  Yes Historical Provider, MD   magnesium oxide (MAGOX 400) 400 (241.3 MG) MG TABS tablet Take 400 mg by mouth 2 times daily  2/6/15  Yes Historical Provider, MD   pravastatin (PRAVACHOL) 40 MG tablet Take 40 mg by mouth.  2/6/15  Yes Historical Provider, MD   meclizine (ANTIVERT) 25 MG tablet Take 25 mg by mouth as needed (vertigo)    Historical Provider, MD   furosemide (LASIX) 20 MG tablet Take 20 mg by mouth three times a week Indications: takes on mon,wed,fri     Historical Provider, MD       Current medications:    Current Facility-Administered Medications   Medication Dose Route Frequency Provider Last Rate Last Dose    ceFAZolin (ANCEF) 2 g in dextrose 5 % 50 mL IVPB  2 g Intravenous Once Bertha Everett MD        lactated ringers infusion   Intravenous Once Bertha Everett MD        lactated ringers infusion   Intravenous Continuous Adam Navarrete MD        sodium chloride flush 0.9 % injection 10 mL  10 mL Intravenous 2 times per day Adam Navarrete MD        sodium chloride flush 0.9 % injection 10 mL  10 mL Intravenous PRN Adam Navarrete MD        lidocaine PF 1 % injection 1 mL  1 mL Intradermal Once PRN Adam Navarrete MD           Allergies:     Allergies   Allergen Reactions    Azithromycin Other (See Comments)     \"BP dropped\"       Problem List:    Patient Active Problem List   Diagnosis Code    Personal history of breast cancer Z85.3    History of antineoplastic chemotherapy Z92.21    Cardiomyopathy due to chemotherapy ([facility] [address] I42.7, T45.1X5A    Family history of breast cancer Z80.3    Family history of ovarian cancer Z80.41    Knee strain S86.919A    Right knee pain M25.561    Poor venous access I87.8       Past Medical History:        Diagnosis Date    Allergic     Arthritis     Breast cancer ([facility] [address]     , Radiation, Chemo    CAD (coronary artery disease)     cardiomyopathy    CHF (congestive heart failure) ([address]     Diabetes mellitus ([address]     No medications    Heart disease     Hx of blood clots     blood clot at PICC line site    ICD (implantable cardioverter-defibrillator) in place     Metastatic disease ([address]     Mitral regurgitation     Nonischemic cardiomyopathy ([facility] [address]     RADHA (obstructive sleep apnea)     no cpap, had several years ago but didn't use    PONV (postoperative nausea and vomiting)     nausea; no vomiting       Past Surgical History:        Procedure Laterality Date    BREAST LUMPECTOMY Left     lymph node dissection     CARDIAC DEFIBRILLATOR PLACEMENT Left      SECTION      COLONOSCOPY  2017    DILATION AND CURETTAGE OF UTERUS  2011    HYSTERECTOMY  12/07/2017    TOTAL ABDOMINAL HYSTERECTOMY, BILATERAL SALPINGO-OOPHORECTOMY LYSIS OF ADHESIONS    LYMPH NODE DISSECTION      OTHER SURGICAL HISTORY N/A 11/09/2017    attempted diagnostic hysteroscopy       Social History:    Social History     Tobacco Use    Smoking status: Never Smoker    Smokeless tobacco: Never Used   Substance Use Topics    Alcohol use: Yes     Alcohol/week: 2.0 standard drinks     Types: 2 Glasses of wine per week     Comment: 2-3 drinks per week                                Counseling given: Not Answered      Vital Signs (Current):   Vitals:    09/22/20 0923   BP: 111/77   Pulse: 91   Resp: 18   Temp: 97.7 °F (36.5 °C)   TempSrc: Oral   SpO2: 95%   Weight: 250 lb (113.4 kg)   Height: 5' 5\" (1.651 m)                                              BP Readings from Last 3 Encounters:   09/22/20 111/77   12/09/17 129/76   11/09/17 120/63       NPO Status: Time of last liquid consumption: 0800                        Time of last solid consumption: 2300                        Date of last liquid consumption: 09/22/20                        Date of last solid food consumption: 09/21/20    BMI:   Wt Readings from Last 3 Encounters:   09/22/20 250 lb (113.4 kg)   12/07/17 255 lb (115.7 kg)   12/06/17 254 lb (115.2 kg)     Body mass index is 41.6 kg/m².     CBC:   Lab Results   Component Value Date    WBC 12.6 12/08/2017    RBC 3.83 12/08/2017    RBC 4.75 01/06/2017    HGB 11.2 12/08/2017    HCT 33.9 12/08/2017    MCV 88.6 12/08/2017    RDW 14.4 12/08/2017     12/08/2017       CMP:   Lab Results   Component Value Date     12/08/2017    K 4.8 12/08/2017    CL 97 12/08/2017    CO2 29 12/08/2017    BUN 12 12/08/2017    CREATININE 0.7 12/08/2017    GFRAA >60 12/08/2017    AGRATIO 1.3 12/08/2017    LABGLOM >60 12/08/2017    GLUCOSE 138 12/08/2017    PROT 6.2 12/08/2017    CALCIUM 9.0 12/08/2017    BILITOT 0.4 12/08/2017    ALKPHOS 70 12/08/2017 AST 12 12/08/2017    ALT 10 12/08/2017       POC Tests: No results for input(s): POCGLU, POCNA, POCK, POCCL, POCBUN, POCHEMO, POCHCT in the last 72 hours. Coags: No results found for: PROTIME, INR, APTT    HCG (If Applicable): No results found for: PREGTESTUR, PREGSERUM, HCG, HCGQUANT     ABGs: No results found for: PHART, PO2ART, NXM5GJT, QKN1BXI, BEART, E5KHLBCH     Type & Screen (If Applicable):  No results found for: LABABO, LABRH    Drug/Infectious Status (If Applicable):  No results found for: HIV, HEPCAB    COVID-19 Screening (If Applicable):   Lab Results   Component Value Date    COVID19 NOT DETECTED 09/16/2020         Anesthesia Evaluation  Patient summary reviewed no history of anesthetic complications:   Airway: Mallampati: III  TM distance: >3 FB   Neck ROM: full  Mouth opening: > = 3 FB Dental: normal exam         Pulmonary:   (+) sleep apnea: on CPAP,                             Cardiovascular:    (+) valvular problems/murmurs: MR, pacemaker (AICD placed after CHF ): AICD, CHF:,                ROS comment: CHF inn 2006 when after chemo her EF was 15% and now it is 45%      Neuro/Psych:               GI/Hepatic/Renal:             Endo/Other:    (+) Diabetes, : arthritis:., .                  ROS comment: Breast Ca Abdominal:           Vascular:                                        Anesthesia Plan      general and MAC     ASA 3       Induction: intravenous. Anesthetic plan and risks discussed with patient.                       Chantelle Knapp MD   9/22/2020

## 2020-09-24 NOTE — OP NOTE
[facility] [address]                                OPERATIVE REPORT    PATIENT NAME: Renetta Kim                       :        1965  MED REC NO:   [id number]                          ROOM:  ACCOUNT NO:   [de-identified]                           ADMIT DATE: 2020  PROVIDER:     Madelin Lee MD    DATE OF PROCEDURE:  2020    PREOPERATIVE DIAGNOSIS:  Poor venous access. POSTOPERATIVE DIAGNOSIS:  Poor venous access. PROCEDURE:  Placement of right subclavian port with intraoperative  fluoroscopy. SURGEON:  Madelin Lee MD    ASSISTANT:  Melodie Kirby    ANESTHESIA:  Local MAC. BLOOD LOSS:  Less than 50 mL. INDICATIONS:  The patient is a 57-year-old woman who was found to have  recurrent bilateral breast cancer. She requires neoadjuvant  chemotherapy and presents for placement of the port. The risk of  pneumothorax was discussed with the patient prior to obtaining informed  consent. PROCEDURE IN DETAILS:  The patient was taken to the operating room,  placed in the supine position. After administration of IV sedation, a  shoulder roll was placed along the spine. The patient's right upper  chest and neck were prepped and draped in the usual sterile fashion. A  field block was performed using 1% lidocaine and 0.5% Marcaine mixed in  equal parts. The right subclavian vein was accessed on the first  attempt with good venous return. We passed a guidewire using Seldinger  technique. A transverse incision was then made at the entry point of  the guidewire and extended to the subcutaneous tissues. We then  proceeded with development of the subcutaneous pocket using  electrocautery. Two 2-0 silk sutures were passed through the port and  the pectoralis fascia and left untied. A dilator sheath was passed over  the wire. The wire and the dilator were then withdrawn.   We passed the  catheter through the sheath. The sheath was then torn away. We  positioned the tip of the catheter in the superior vena cava and cut it  to the appropriate length. The catheter was connected to the port. The  port was delivered into the subcutaneous pocket and the tacking sutures  were tied. The incision was closed with interrupted deep dermal sutures  followed by 4-0 Vicryl subcuticular skin closure. The port was accessed  with good venous return and flushed with 20 mL of saline and 5 mL of  heparin flush. Postoperative chest x-ray was ordered to confirm the  location of the tip of the catheter and the absence of pneumothorax. The port placed was an 8-[facility].         Renato Verma MD    D: 09/23/2020 20:02:52       T: 09/23/2020 21:24:31     [email or web address]  Job#: [id number]     Doc#: [id number]    CC:

## 2020-12-09 ENCOUNTER — HOSPITAL ENCOUNTER (OUTPATIENT)
Dept: NUCLEAR MEDICINE | Age: 55
End: 2020-12-09
Payer: COMMERCIAL

## 2020-12-09 ENCOUNTER — HOSPITAL ENCOUNTER (OUTPATIENT)
Dept: CT IMAGING | Age: 55
Discharge: HOME | End: 2020-12-09
Payer: COMMERCIAL

## 2020-12-09 ENCOUNTER — HOSPITAL ENCOUNTER (OUTPATIENT)
Dept: NUCLEAR MEDICINE | Age: 55
Discharge: HOME | End: 2020-12-09
Payer: COMMERCIAL

## 2020-12-09 PROCEDURE — 71260 CT THORAX DX C+: CPT

## 2020-12-09 PROCEDURE — 6360000004 HC RX CONTRAST MEDICATION: Performed by: INTERNAL MEDICINE

## 2020-12-09 PROCEDURE — 3430000000 HC RX DIAGNOSTIC RADIOPHARMACEUTICAL: Performed by: INTERNAL MEDICINE

## 2020-12-09 PROCEDURE — 78306 BONE IMAGING WHOLE BODY: CPT

## 2020-12-09 PROCEDURE — 74160 CT ABDOMEN W/CONTRAST: CPT

## 2020-12-09 PROCEDURE — A9503 TC99M MEDRONATE: HCPCS | Performed by: INTERNAL MEDICINE

## 2020-12-09 RX ORDER — TC 99M MEDRONATE 20 MG/10ML
25.8 INJECTION, POWDER, LYOPHILIZED, FOR SOLUTION INTRAVENOUS
Status: COMPLETED | OUTPATIENT
Start: 2020-12-09 | End: 2020-12-09

## 2020-12-09 RX ADMIN — IOHEXOL 50 ML: 240 INJECTION, SOLUTION INTRATHECAL; INTRAVASCULAR; INTRAVENOUS; ORAL at 10:17

## 2020-12-09 RX ADMIN — TC 99M MEDRONATE 25.8 MILLICURIE: 20 INJECTION, POWDER, LYOPHILIZED, FOR SOLUTION INTRAVENOUS at 09:12

## 2020-12-09 RX ADMIN — IOPAMIDOL 100 ML: 755 INJECTION, SOLUTION INTRAVENOUS at 10:18

## 2021-04-06 ENCOUNTER — HOSPITAL ENCOUNTER (OUTPATIENT)
Dept: CT IMAGING | Age: 56
Discharge: HOME | End: 2021-04-06
Payer: COMMERCIAL

## 2021-04-06 ENCOUNTER — HOSPITAL ENCOUNTER (OUTPATIENT)
Dept: NUCLEAR MEDICINE | Age: 56
Discharge: HOME | End: 2021-04-06
Payer: COMMERCIAL

## 2021-04-06 DIAGNOSIS — C79.51 SECONDARY MALIGNANT NEOPLASM OF BONE AND BONE MARROW (HCC): ICD-10-CM

## 2021-04-06 DIAGNOSIS — C50.819 MALIGNANT NEOPLASM OF MIDLINE OF BREAST, UNSPECIFIED LATERALITY (HCC): ICD-10-CM

## 2021-04-06 DIAGNOSIS — C79.52 SECONDARY MALIGNANT NEOPLASM OF BONE AND BONE MARROW (HCC): ICD-10-CM

## 2021-04-06 PROCEDURE — 78306 BONE IMAGING WHOLE BODY: CPT

## 2021-04-06 PROCEDURE — 3430000000 HC RX DIAGNOSTIC RADIOPHARMACEUTICAL: Performed by: INTERNAL MEDICINE

## 2021-04-06 PROCEDURE — A9503 TC99M MEDRONATE: HCPCS | Performed by: INTERNAL MEDICINE

## 2021-04-06 PROCEDURE — 74177 CT ABD & PELVIS W/CONTRAST: CPT

## 2021-04-06 PROCEDURE — 6360000004 HC RX CONTRAST MEDICATION: Performed by: INTERNAL MEDICINE

## 2021-04-06 RX ORDER — TC 99M MEDRONATE 20 MG/10ML
26.9 INJECTION, POWDER, LYOPHILIZED, FOR SOLUTION INTRAVENOUS
Status: COMPLETED | OUTPATIENT
Start: 2021-04-06 | End: 2021-04-06

## 2021-04-06 RX ADMIN — IOPAMIDOL 100 ML: 755 INJECTION, SOLUTION INTRAVENOUS at 10:13

## 2021-04-06 RX ADMIN — IOHEXOL 50 ML: 240 INJECTION, SOLUTION INTRATHECAL; INTRAVASCULAR; INTRAVENOUS; ORAL at 10:13

## 2021-04-06 RX ADMIN — TC 99M MEDRONATE 26.9 MILLICURIE: 20 INJECTION, POWDER, LYOPHILIZED, FOR SOLUTION INTRAVENOUS at 09:20

## 2021-07-13 ENCOUNTER — HOSPITAL ENCOUNTER (OUTPATIENT)
Dept: NUCLEAR MEDICINE | Age: 56
Discharge: HOME | End: 2021-07-13
Payer: COMMERCIAL

## 2021-07-13 ENCOUNTER — HOSPITAL ENCOUNTER (OUTPATIENT)
Dept: CT IMAGING | Age: 56
Discharge: HOME | End: 2021-07-13
Payer: COMMERCIAL

## 2021-07-13 ENCOUNTER — HOSPITAL ENCOUNTER (OUTPATIENT)
Dept: VASCULAR LAB | Age: 56
Discharge: HOME | End: 2021-07-13
Payer: COMMERCIAL

## 2021-07-13 DIAGNOSIS — C50.819 MALIGNANT NEOPLASM OF MIDLINE OF BREAST, UNSPECIFIED LATERALITY (HCC): ICD-10-CM

## 2021-07-13 DIAGNOSIS — R22.9 SKIN SWELLING: ICD-10-CM

## 2021-07-13 PROCEDURE — A9503 TC99M MEDRONATE: HCPCS | Performed by: INTERNAL MEDICINE

## 2021-07-13 PROCEDURE — 3430000000 HC RX DIAGNOSTIC RADIOPHARMACEUTICAL: Performed by: INTERNAL MEDICINE

## 2021-07-13 PROCEDURE — 74177 CT ABD & PELVIS W/CONTRAST: CPT

## 2021-07-13 PROCEDURE — 93970 EXTREMITY STUDY: CPT

## 2021-07-13 PROCEDURE — 78306 BONE IMAGING WHOLE BODY: CPT

## 2021-07-13 PROCEDURE — 6360000004 HC RX CONTRAST MEDICATION: Performed by: INTERNAL MEDICINE

## 2021-07-13 RX ORDER — TC 99M MEDRONATE 20 MG/10ML
24.9 INJECTION, POWDER, LYOPHILIZED, FOR SOLUTION INTRAVENOUS
Status: COMPLETED | OUTPATIENT
Start: 2021-07-13 | End: 2021-07-13

## 2021-07-13 RX ADMIN — TC 99M MEDRONATE 24.9 MILLICURIE: 20 INJECTION, POWDER, LYOPHILIZED, FOR SOLUTION INTRAVENOUS at 08:24

## 2021-07-13 RX ADMIN — IOHEXOL 50 ML: 240 INJECTION, SOLUTION INTRATHECAL; INTRAVASCULAR; INTRAVENOUS; ORAL at 09:10

## 2021-07-13 RX ADMIN — IOPAMIDOL 100 ML: 755 INJECTION, SOLUTION INTRAVENOUS at 09:11

## 2021-09-08 ENCOUNTER — HOSPITAL ENCOUNTER (OUTPATIENT)
Dept: CT IMAGING | Age: 56
Discharge: HOME | End: 2021-09-08
Payer: COMMERCIAL

## 2021-09-08 DIAGNOSIS — C79.51 SECONDARY MALIGNANT NEOPLASM OF BONE AND BONE MARROW (HCC): ICD-10-CM

## 2021-09-08 DIAGNOSIS — C79.52 SECONDARY MALIGNANT NEOPLASM OF BONE AND BONE MARROW (HCC): ICD-10-CM

## 2021-09-08 DIAGNOSIS — R22.9 LOCALIZED SUPERFICIAL SWELLING, MASS, OR LUMP: ICD-10-CM

## 2021-09-08 LAB
GFR AFRICAN AMERICAN: 31
GFR NON-AFRICAN AMERICAN: 26
PERFORMED ON: ABNORMAL
POC CREATININE: 2 MG/DL (ref 0.6–1.1)
POC SAMPLE TYPE: ABNORMAL

## 2021-09-08 PROCEDURE — 82565 ASSAY OF CREATININE: CPT

## 2021-09-08 PROCEDURE — 72131 CT LUMBAR SPINE W/O DYE: CPT

## 2021-10-25 ENCOUNTER — HOSPITAL ENCOUNTER (OUTPATIENT)
Dept: CT IMAGING | Age: 56
Discharge: HOME | End: 2021-10-25
Payer: COMMERCIAL

## 2021-10-25 ENCOUNTER — HOSPITAL ENCOUNTER (OUTPATIENT)
Dept: NUCLEAR MEDICINE | Age: 56
Discharge: HOME | End: 2021-10-25
Payer: COMMERCIAL

## 2021-10-25 DIAGNOSIS — C50.819 MALIGNANT NEOPLASM OF MIDLINE OF BREAST, UNSPECIFIED LATERALITY (HCC): ICD-10-CM

## 2021-10-25 DIAGNOSIS — C50.819 MALIGNANT NEOPLASM OF OVERLAPPING SITES OF BREAST IN FEMALE, ESTROGEN RECEPTOR POSITIVE, UNSPECIFIED LATERALITY (HCC): ICD-10-CM

## 2021-10-25 DIAGNOSIS — Z17.0 MALIGNANT NEOPLASM OF OVERLAPPING SITES OF BREAST IN FEMALE, ESTROGEN RECEPTOR POSITIVE, UNSPECIFIED LATERALITY (HCC): ICD-10-CM

## 2021-10-25 PROCEDURE — 6360000004 HC RX CONTRAST MEDICATION: Performed by: INTERNAL MEDICINE

## 2021-10-25 PROCEDURE — 78306 BONE IMAGING WHOLE BODY: CPT

## 2021-10-25 PROCEDURE — 3430000000 HC RX DIAGNOSTIC RADIOPHARMACEUTICAL: Performed by: INTERNAL MEDICINE

## 2021-10-25 PROCEDURE — A9503 TC99M MEDRONATE: HCPCS | Performed by: INTERNAL MEDICINE

## 2021-10-25 PROCEDURE — 74177 CT ABD & PELVIS W/CONTRAST: CPT

## 2021-10-25 RX ORDER — TC 99M MEDRONATE 20 MG/10ML
26.2 INJECTION, POWDER, LYOPHILIZED, FOR SOLUTION INTRAVENOUS
Status: COMPLETED | OUTPATIENT
Start: 2021-10-25 | End: 2021-10-25

## 2021-10-25 RX ADMIN — IOHEXOL 50 ML: 240 INJECTION, SOLUTION INTRATHECAL; INTRAVASCULAR; INTRAVENOUS; ORAL at 08:45

## 2021-10-25 RX ADMIN — TC 99M MEDRONATE 26.2 MILLICURIE: 20 INJECTION, POWDER, LYOPHILIZED, FOR SOLUTION INTRAVENOUS at 08:45

## 2021-10-25 RX ADMIN — IOPAMIDOL 100 ML: 755 INJECTION, SOLUTION INTRAVENOUS at 08:44

## 2022-02-18 ENCOUNTER — HOSPITAL ENCOUNTER (OUTPATIENT)
Dept: NUCLEAR MEDICINE | Age: 57
Discharge: HOME | End: 2022-02-18
Payer: COMMERCIAL

## 2022-02-18 ENCOUNTER — HOSPITAL ENCOUNTER (OUTPATIENT)
Dept: CT IMAGING | Age: 57
Discharge: HOME | End: 2022-02-18
Payer: COMMERCIAL

## 2022-02-18 DIAGNOSIS — C50.819 MALIGNANT NEOPLASM OF MIDLINE OF BREAST, UNSPECIFIED LATERALITY (HCC): ICD-10-CM

## 2022-02-18 DIAGNOSIS — C79.51 BONE METASTASIS (HCC): ICD-10-CM

## 2022-02-18 PROCEDURE — A9503 TC99M MEDRONATE: HCPCS | Performed by: INTERNAL MEDICINE

## 2022-02-18 PROCEDURE — 78306 BONE IMAGING WHOLE BODY: CPT

## 2022-02-18 PROCEDURE — 6360000004 HC RX CONTRAST MEDICATION: Performed by: NURSE PRACTITIONER

## 2022-02-18 PROCEDURE — 74177 CT ABD & PELVIS W/CONTRAST: CPT

## 2022-02-18 PROCEDURE — 3430000000 HC RX DIAGNOSTIC RADIOPHARMACEUTICAL: Performed by: INTERNAL MEDICINE

## 2022-02-18 RX ORDER — TC 99M MEDRONATE 20 MG/10ML
26 INJECTION, POWDER, LYOPHILIZED, FOR SOLUTION INTRAVENOUS
Status: COMPLETED | OUTPATIENT
Start: 2022-02-18 | End: 2022-02-18

## 2022-02-18 RX ADMIN — IOHEXOL 50 ML: 240 INJECTION, SOLUTION INTRATHECAL; INTRAVASCULAR; INTRAVENOUS; ORAL at 11:15

## 2022-02-18 RX ADMIN — TC 99M MEDRONATE 26 MILLICURIE: 20 INJECTION, POWDER, LYOPHILIZED, FOR SOLUTION INTRAVENOUS at 09:33

## 2022-02-18 RX ADMIN — IOPAMIDOL 100 ML: 755 INJECTION, SOLUTION INTRAVENOUS at 11:15

## 2022-05-06 ENCOUNTER — HOSPITAL ENCOUNTER (OUTPATIENT)
Age: 57
Discharge: HOME | End: 2022-05-06
Payer: COMMERCIAL

## 2022-05-06 ENCOUNTER — HOSPITAL ENCOUNTER (OUTPATIENT)
Dept: NUCLEAR MEDICINE | Age: 57
Discharge: HOME | End: 2022-05-06
Payer: COMMERCIAL

## 2022-05-06 ENCOUNTER — HOSPITAL ENCOUNTER (OUTPATIENT)
Dept: CT IMAGING | Age: 57
Discharge: HOME | End: 2022-05-06
Payer: COMMERCIAL

## 2022-05-06 DIAGNOSIS — C79.52 SECONDARY MALIGNANT NEOPLASM OF BONE AND BONE MARROW (HCC): ICD-10-CM

## 2022-05-06 DIAGNOSIS — C50.819 MALIGNANT NEOPLASM OF MIDLINE OF BREAST, UNSPECIFIED LATERALITY (HCC): ICD-10-CM

## 2022-05-06 DIAGNOSIS — C79.51 SECONDARY MALIGNANT NEOPLASM OF BONE AND BONE MARROW (HCC): ICD-10-CM

## 2022-05-06 DIAGNOSIS — C50.819 OVERLAPPING MALIGNANT NEOPLASM OF FEMALE BREAST, UNSPECIFIED ESTROGEN RECEPTOR STATUS, UNSPECIFIED LATERALITY (HCC): ICD-10-CM

## 2022-05-06 PROCEDURE — 78306 BONE IMAGING WHOLE BODY: CPT

## 2022-05-06 PROCEDURE — A9503 TC99M MEDRONATE: HCPCS | Performed by: NURSE PRACTITIONER

## 2022-05-06 PROCEDURE — 6360000004 HC RX CONTRAST MEDICATION: Performed by: NURSE PRACTITIONER

## 2022-05-06 PROCEDURE — 74177 CT ABD & PELVIS W/CONTRAST: CPT

## 2022-05-06 PROCEDURE — 3430000000 HC RX DIAGNOSTIC RADIOPHARMACEUTICAL: Performed by: NURSE PRACTITIONER

## 2022-05-06 RX ORDER — TC 99M MEDRONATE 20 MG/10ML
27.5 INJECTION, POWDER, LYOPHILIZED, FOR SOLUTION INTRAVENOUS
Status: COMPLETED | OUTPATIENT
Start: 2022-05-06 | End: 2022-05-06

## 2022-05-06 RX ADMIN — IOPAMIDOL 100 ML: 755 INJECTION, SOLUTION INTRAVENOUS at 10:06

## 2022-05-06 RX ADMIN — TC 99M MEDRONATE 27.5 MILLICURIE: 20 INJECTION, POWDER, LYOPHILIZED, FOR SOLUTION INTRAVENOUS at 09:09

## 2022-05-06 RX ADMIN — IOHEXOL 50 ML: 240 INJECTION, SOLUTION INTRATHECAL; INTRAVASCULAR; INTRAVENOUS; ORAL at 10:01

## 2022-09-30 ENCOUNTER — HOSPITAL ENCOUNTER (OUTPATIENT)
Age: 57
Discharge: HOME | End: 2022-09-30
Payer: COMMERCIAL

## 2022-09-30 ENCOUNTER — HOSPITAL ENCOUNTER (OUTPATIENT)
Dept: NUCLEAR MEDICINE | Age: 57
Discharge: HOME | End: 2022-09-30
Payer: COMMERCIAL

## 2022-09-30 ENCOUNTER — HOSPITAL ENCOUNTER (OUTPATIENT)
Dept: CT IMAGING | Age: 57
Discharge: HOME | End: 2022-09-30
Payer: COMMERCIAL

## 2022-09-30 DIAGNOSIS — C50.812 MALIGNANT NEOPLASM OF OVERLAPPING SITES OF LEFT FEMALE BREAST, UNSPECIFIED ESTROGEN RECEPTOR STATUS (HCC): ICD-10-CM

## 2022-09-30 PROCEDURE — 78306 BONE IMAGING WHOLE BODY: CPT | Performed by: ORTHOPAEDIC SURGERY

## 2022-09-30 PROCEDURE — A9503 TC99M MEDRONATE: HCPCS | Performed by: ORTHOPAEDIC SURGERY

## 2022-09-30 PROCEDURE — 74177 CT ABD & PELVIS W/CONTRAST: CPT

## 2022-09-30 PROCEDURE — 6360000004 HC RX CONTRAST MEDICATION: Performed by: INTERNAL MEDICINE

## 2022-09-30 PROCEDURE — 3430000000 HC RX DIAGNOSTIC RADIOPHARMACEUTICAL: Performed by: ORTHOPAEDIC SURGERY

## 2022-09-30 RX ORDER — TC 99M MEDRONATE 20 MG/10ML
25.5 INJECTION, POWDER, LYOPHILIZED, FOR SOLUTION INTRAVENOUS
Status: COMPLETED | OUTPATIENT
Start: 2022-09-30 | End: 2022-09-30

## 2022-09-30 RX ADMIN — IOPAMIDOL 75 ML: 755 INJECTION, SOLUTION INTRAVENOUS at 11:09

## 2022-09-30 RX ADMIN — TC 99M MEDRONATE 25.5 MILLICURIE: 20 INJECTION, POWDER, LYOPHILIZED, FOR SOLUTION INTRAVENOUS at 10:43

## 2022-09-30 RX ADMIN — DIATRIZOATE MEGLUMINE AND DIATRIZOATE SODIUM 12 ML: 660; 100 LIQUID ORAL; RECTAL at 11:08

## 2023-03-17 ENCOUNTER — HOSPITAL ENCOUNTER (OUTPATIENT)
Dept: CT IMAGING | Age: 58
Discharge: HOME | End: 2023-03-17
Payer: COMMERCIAL

## 2023-03-17 ENCOUNTER — HOSPITAL ENCOUNTER (OUTPATIENT)
Dept: NUCLEAR MEDICINE | Age: 58
Discharge: HOME | End: 2023-03-17
Payer: COMMERCIAL

## 2023-03-17 ENCOUNTER — HOSPITAL ENCOUNTER (OUTPATIENT)
Dept: NUCLEAR MEDICINE | Age: 58
Discharge: HOME | End: 2023-03-17

## 2023-03-17 DIAGNOSIS — C50.819: ICD-10-CM

## 2023-03-17 DIAGNOSIS — C78.00 MALIGNANT NEOPLASM METASTATIC TO LUNG, UNSPECIFIED LATERALITY (HCC): ICD-10-CM

## 2023-03-17 DIAGNOSIS — C78.7 SECONDARY MALIGNANT NEOPLASM OF LIVER (HCC): ICD-10-CM

## 2023-03-17 DIAGNOSIS — C79.52 SECONDARY MALIGNANT NEOPLASM OF BONE AND BONE MARROW (HCC): ICD-10-CM

## 2023-03-17 DIAGNOSIS — C50.819 MALIGNANT NEOPLASM OF MIDLINE OF BREAST, UNSPECIFIED LATERALITY (HCC): ICD-10-CM

## 2023-03-17 DIAGNOSIS — C79.51 SECONDARY MALIGNANT NEOPLASM OF BONE AND BONE MARROW (HCC): ICD-10-CM

## 2023-03-17 PROCEDURE — A4641 RADIOPHARM DX AGENT NOC: HCPCS | Performed by: INTERNAL MEDICINE

## 2023-03-17 PROCEDURE — 3430000000 HC RX DIAGNOSTIC RADIOPHARMACEUTICAL: Performed by: INTERNAL MEDICINE

## 2023-03-17 PROCEDURE — A9503 TC99M MEDRONATE: HCPCS | Performed by: INTERNAL MEDICINE

## 2023-03-17 PROCEDURE — 78306 BONE IMAGING WHOLE BODY: CPT | Performed by: INTERNAL MEDICINE

## 2023-03-17 PROCEDURE — 74177 CT ABD & PELVIS W/CONTRAST: CPT

## 2023-03-17 PROCEDURE — 6360000004 HC RX CONTRAST MEDICATION: Performed by: INTERNAL MEDICINE

## 2023-03-17 RX ORDER — TC 99M MEDRONATE 20 MG/10ML
25 INJECTION, POWDER, LYOPHILIZED, FOR SOLUTION INTRAVENOUS
Status: COMPLETED | OUTPATIENT
Start: 2023-03-17 | End: 2023-03-17

## 2023-03-17 RX ADMIN — BARIUM SULFATE 900 ML: 21 SUSPENSION ORAL at 10:04

## 2023-03-17 RX ADMIN — IOPAMIDOL 75 ML: 755 INJECTION, SOLUTION INTRAVENOUS at 10:05

## 2023-03-17 RX ADMIN — TC 99M MEDRONATE 25 MILLICURIE: 20 INJECTION, POWDER, LYOPHILIZED, FOR SOLUTION INTRAVENOUS at 09:28

## 2023-09-22 ENCOUNTER — HOSPITAL ENCOUNTER (OUTPATIENT)
Dept: NUCLEAR MEDICINE | Age: 58
Discharge: HOME | End: 2023-09-22
Payer: COMMERCIAL

## 2023-09-22 ENCOUNTER — APPOINTMENT (OUTPATIENT)
Dept: CT IMAGING | Age: 58
End: 2023-09-22
Payer: COMMERCIAL

## 2023-09-22 ENCOUNTER — HOSPITAL ENCOUNTER (OUTPATIENT)
Age: 58
Discharge: HOME | End: 2023-09-22
Payer: COMMERCIAL

## 2023-09-22 ENCOUNTER — HOSPITAL ENCOUNTER (OUTPATIENT)
Dept: CT IMAGING | Age: 58
Discharge: HOME | End: 2023-09-22
Payer: COMMERCIAL

## 2023-09-22 DIAGNOSIS — C78.00 MALIGNANT NEOPLASM METASTATIC TO LUNG, UNSPECIFIED LATERALITY (HCC): ICD-10-CM

## 2023-09-22 DIAGNOSIS — C50.812 MALIGNANT NEOPLASM OF OVERLAPPING SITES OF LEFT FEMALE BREAST, UNSPECIFIED ESTROGEN RECEPTOR STATUS (HCC): ICD-10-CM

## 2023-09-22 DIAGNOSIS — C79.51 SECONDARY MALIGNANT NEOPLASM OF BONE (HCC): ICD-10-CM

## 2023-09-22 DIAGNOSIS — C78.7 SECONDARY MALIGNANT NEOPLASM OF LIVER AND INTRAHEPATIC BILE DUCT (HCC): ICD-10-CM

## 2023-09-22 LAB
PERFORMED ON: ABNORMAL
POC CREATININE: 1.2 MG/DL (ref 0.6–1.2)
POC SAMPLE TYPE: ABNORMAL

## 2023-09-22 PROCEDURE — 6360000004 HC RX CONTRAST MEDICATION: Performed by: INTERNAL MEDICINE

## 2023-09-22 PROCEDURE — 82565 ASSAY OF CREATININE: CPT

## 2023-09-22 PROCEDURE — 3430000000 HC RX DIAGNOSTIC RADIOPHARMACEUTICAL: Performed by: INTERNAL MEDICINE

## 2023-09-22 PROCEDURE — 71260 CT THORAX DX C+: CPT

## 2023-09-22 PROCEDURE — A9503 TC99M MEDRONATE: HCPCS | Performed by: INTERNAL MEDICINE

## 2023-09-22 PROCEDURE — 78306 BONE IMAGING WHOLE BODY: CPT | Performed by: INTERNAL MEDICINE

## 2023-09-22 RX ORDER — TC 99M MEDRONATE 20 MG/10ML
24 INJECTION, POWDER, LYOPHILIZED, FOR SOLUTION INTRAVENOUS
Status: COMPLETED | OUTPATIENT
Start: 2023-09-22 | End: 2023-09-22

## 2023-09-22 RX ADMIN — IOPAMIDOL 75 ML: 755 INJECTION, SOLUTION INTRAVENOUS at 11:30

## 2023-09-22 RX ADMIN — DIATRIZOATE MEGLUMINE AND DIATRIZOATE SODIUM 12 ML: 660; 100 LIQUID ORAL; RECTAL at 11:31

## 2023-09-22 RX ADMIN — TC 99M MEDRONATE 24 MILLICURIE: 20 INJECTION, POWDER, LYOPHILIZED, FOR SOLUTION INTRAVENOUS at 12:02

## 2023-09-28 ENCOUNTER — HOSPITAL ENCOUNTER (OUTPATIENT)
Dept: NUCLEAR MEDICINE | Age: 58
Discharge: HOME | End: 2023-09-28
Payer: COMMERCIAL

## 2023-09-28 DIAGNOSIS — C50.912 MALIGNANT NEOPLASM OF LEFT FEMALE BREAST, UNSPECIFIED ESTROGEN RECEPTOR STATUS, UNSPECIFIED SITE OF BREAST (HCC): ICD-10-CM

## 2023-09-28 PROCEDURE — 78306 BONE IMAGING WHOLE BODY: CPT | Performed by: INTERNAL MEDICINE

## 2024-07-12 ENCOUNTER — HOSPITAL ENCOUNTER (OUTPATIENT)
Dept: CT IMAGING | Age: 59
Discharge: HOME | End: 2024-07-12
Payer: COMMERCIAL

## 2024-07-12 DIAGNOSIS — C50.819 MALIGNANT NEOPLASM OF MIDLINE OF BREAST, UNSPECIFIED LATERALITY (HCC): ICD-10-CM

## 2024-07-12 PROCEDURE — 74176 CT ABD & PELVIS W/O CONTRAST: CPT

## 2024-07-21 ENCOUNTER — HOSPITAL ENCOUNTER (INPATIENT)
Age: 59
LOS: 2 days | Discharge: HOME | DRG: 682 | End: 2024-07-23
Attending: STUDENT IN AN ORGANIZED HEALTH CARE EDUCATION/TRAINING PROGRAM | Admitting: INTERNAL MEDICINE
Payer: COMMERCIAL

## 2024-07-21 ENCOUNTER — APPOINTMENT (OUTPATIENT)
Dept: ULTRASOUND IMAGING | Age: 59
DRG: 682 | End: 2024-07-21
Payer: COMMERCIAL

## 2024-07-21 DIAGNOSIS — N17.9 ACUTE RENAL FAILURE, UNSPECIFIED ACUTE RENAL FAILURE TYPE (HCC): Primary | ICD-10-CM

## 2024-07-21 LAB
ALBUMIN SERPL-MCNC: 4.1 G/DL (ref 3.4–5)
ALBUMIN/GLOB SERPL: 1.5 {RATIO} (ref 1.1–2.2)
ALP SERPL-CCNC: 70 U/L (ref 40–129)
ALT SERPL-CCNC: 8 U/L (ref 10–40)
ANION GAP SERPL CALCULATED.3IONS-SCNC: 11 MMOL/L (ref 3–16)
AST SERPL-CCNC: 15 U/L (ref 15–37)
BACTERIA URNS QL MICRO: ABNORMAL /HPF
BASOPHILS # BLD: 0.1 K/UL (ref 0–0.2)
BASOPHILS NFR BLD: 2 %
BILIRUB SERPL-MCNC: 0.4 MG/DL (ref 0–1)
BILIRUB UR QL STRIP.AUTO: NEGATIVE
BUN SERPL-MCNC: 43 MG/DL (ref 7–20)
CALCIUM SERPL-MCNC: 9.7 MG/DL (ref 8.3–10.6)
CHLORIDE SERPL-SCNC: 103 MMOL/L (ref 99–110)
CLARITY UR: CLEAR
CO2 SERPL-SCNC: 24 MMOL/L (ref 21–32)
COLOR UR: YELLOW
CREAT SERPL-MCNC: 3.4 MG/DL (ref 0.6–1.2)
CREAT UR-MCNC: 27.5 MG/DL (ref 28–259)
DEPRECATED RDW RBC AUTO: 15 % (ref 12.4–15.4)
EOSINOPHIL # BLD: 0.1 K/UL (ref 0–0.6)
EOSINOPHIL NFR BLD: 3.2 %
GFR SERPLBLD CREATININE-BSD FMLA CKD-EPI: 15 ML/MIN/{1.73_M2}
GLUCOSE BLD-MCNC: 140 MG/DL (ref 70–99)
GLUCOSE BLD-MCNC: 83 MG/DL (ref 70–99)
GLUCOSE SERPL-MCNC: 155 MG/DL (ref 70–99)
GLUCOSE UR STRIP.AUTO-MCNC: >=1000 MG/DL
HCT VFR BLD AUTO: 26.9 % (ref 36–48)
HGB BLD-MCNC: 9.3 G/DL (ref 12–16)
HGB UR QL STRIP.AUTO: ABNORMAL
KETONES UR STRIP.AUTO-MCNC: NEGATIVE MG/DL
LEUKOCYTE ESTERASE UR QL STRIP.AUTO: ABNORMAL
LIPASE SERPL-CCNC: 33 U/L (ref 13–60)
LYMPHOCYTES # BLD: 1.1 K/UL (ref 1–5.1)
LYMPHOCYTES NFR BLD: 37.2 %
MCH RBC QN AUTO: 32.5 PG (ref 26–34)
MCHC RBC AUTO-ENTMCNC: 34.5 G/DL (ref 31–36)
MCV RBC AUTO: 94.4 FL (ref 80–100)
MONOCYTES # BLD: 0.2 K/UL (ref 0–1.3)
MONOCYTES NFR BLD: 6 %
NEUTROPHILS # BLD: 1.5 K/UL (ref 1.7–7.7)
NEUTROPHILS NFR BLD: 51.6 %
NITRITE UR QL STRIP.AUTO: NEGATIVE
PERFORMED ON: ABNORMAL
PERFORMED ON: NORMAL
PH UR STRIP.AUTO: 6 [PH] (ref 5–8)
PLATELET # BLD AUTO: 94 K/UL (ref 135–450)
PLATELET BLD QL SMEAR: ABNORMAL
PMV BLD AUTO: 7.5 FL (ref 5–10.5)
POTASSIUM SERPL-SCNC: 4.8 MMOL/L (ref 3.5–5.1)
PROT SERPL-MCNC: 6.9 G/DL (ref 6.4–8.2)
PROT UR STRIP.AUTO-MCNC: ABNORMAL MG/DL
PROT UR-MCNC: 9 MG/DL
PROT/CREAT UR-RTO: 0.3 MG/DL
RBC # BLD AUTO: 2.85 M/UL (ref 4–5.2)
RBC #/AREA URNS HPF: ABNORMAL /HPF (ref 0–4)
SLIDE REVIEW: ABNORMAL
SODIUM SERPL-SCNC: 138 MMOL/L (ref 136–145)
SP GR UR STRIP.AUTO: 1.01 (ref 1–1.03)
TROPONIN, HIGH SENSITIVITY: 10 NG/L (ref 0–14)
UA DIPSTICK W REFLEX MICRO PNL UR: YES
URN SPEC COLLECT METH UR: ABNORMAL
UROBILINOGEN UR STRIP-ACNC: 0.2 E.U./DL
WBC # BLD AUTO: 2.9 K/UL (ref 4–11)
WBC #/AREA URNS HPF: ABNORMAL /HPF (ref 0–5)

## 2024-07-21 PROCEDURE — 2060000000 HC ICU INTERMEDIATE R&B

## 2024-07-21 PROCEDURE — 2580000003 HC RX 258: Performed by: STUDENT IN AN ORGANIZED HEALTH CARE EDUCATION/TRAINING PROGRAM

## 2024-07-21 PROCEDURE — 99285 EMERGENCY DEPT VISIT HI MDM: CPT

## 2024-07-21 PROCEDURE — 2580000003 HC RX 258: Performed by: INTERNAL MEDICINE

## 2024-07-21 PROCEDURE — 76770 US EXAM ABDO BACK WALL COMP: CPT

## 2024-07-21 PROCEDURE — 6370000000 HC RX 637 (ALT 250 FOR IP): Performed by: INTERNAL MEDICINE

## 2024-07-21 PROCEDURE — 84156 ASSAY OF PROTEIN URINE: CPT

## 2024-07-21 PROCEDURE — 82570 ASSAY OF URINE CREATININE: CPT

## 2024-07-21 PROCEDURE — 80053 COMPREHEN METABOLIC PANEL: CPT

## 2024-07-21 PROCEDURE — 93005 ELECTROCARDIOGRAM TRACING: CPT | Performed by: STUDENT IN AN ORGANIZED HEALTH CARE EDUCATION/TRAINING PROGRAM

## 2024-07-21 PROCEDURE — 6360000002 HC RX W HCPCS: Performed by: STUDENT IN AN ORGANIZED HEALTH CARE EDUCATION/TRAINING PROGRAM

## 2024-07-21 PROCEDURE — 84484 ASSAY OF TROPONIN QUANT: CPT

## 2024-07-21 PROCEDURE — 96374 THER/PROPH/DIAG INJ IV PUSH: CPT

## 2024-07-21 PROCEDURE — 81001 URINALYSIS AUTO W/SCOPE: CPT

## 2024-07-21 PROCEDURE — 85025 COMPLETE CBC W/AUTO DIFF WBC: CPT

## 2024-07-21 PROCEDURE — 83690 ASSAY OF LIPASE: CPT

## 2024-07-21 RX ORDER — ANASTROZOLE 1 MG/1
1 TABLET ORAL DAILY
Status: DISCONTINUED | OUTPATIENT
Start: 2024-07-22 | End: 2024-07-23 | Stop reason: HOSPADM

## 2024-07-21 RX ORDER — SODIUM CHLORIDE 0.9 % (FLUSH) 0.9 %
5-40 SYRINGE (ML) INJECTION PRN
Status: DISCONTINUED | OUTPATIENT
Start: 2024-07-21 | End: 2024-07-23 | Stop reason: HOSPADM

## 2024-07-21 RX ORDER — INSULIN LISPRO 100 [IU]/ML
0-4 INJECTION, SOLUTION INTRAVENOUS; SUBCUTANEOUS
Status: DISCONTINUED | OUTPATIENT
Start: 2024-07-21 | End: 2024-07-23 | Stop reason: HOSPADM

## 2024-07-21 RX ORDER — GLUCAGON 1 MG/ML
1 KIT INJECTION PRN
Status: DISCONTINUED | OUTPATIENT
Start: 2024-07-21 | End: 2024-07-23 | Stop reason: HOSPADM

## 2024-07-21 RX ORDER — SODIUM CHLORIDE 0.9 % (FLUSH) 0.9 %
5-40 SYRINGE (ML) INJECTION EVERY 12 HOURS SCHEDULED
Status: DISCONTINUED | OUTPATIENT
Start: 2024-07-21 | End: 2024-07-23 | Stop reason: HOSPADM

## 2024-07-21 RX ORDER — CARVEDILOL 25 MG/1
25 TABLET ORAL 2 TIMES DAILY
Status: DISCONTINUED | OUTPATIENT
Start: 2024-07-21 | End: 2024-07-23 | Stop reason: HOSPADM

## 2024-07-21 RX ORDER — ONDANSETRON 4 MG/1
4 TABLET, ORALLY DISINTEGRATING ORAL EVERY 8 HOURS PRN
Status: DISCONTINUED | OUTPATIENT
Start: 2024-07-21 | End: 2024-07-23 | Stop reason: HOSPADM

## 2024-07-21 RX ORDER — SODIUM CHLORIDE 9 MG/ML
INJECTION, SOLUTION INTRAVENOUS CONTINUOUS
Status: DISCONTINUED | OUTPATIENT
Start: 2024-07-21 | End: 2024-07-22

## 2024-07-21 RX ORDER — SODIUM CHLORIDE 9 MG/ML
INJECTION, SOLUTION INTRAVENOUS CONTINUOUS
Status: DISCONTINUED | OUTPATIENT
Start: 2024-07-21 | End: 2024-07-21 | Stop reason: SDUPTHER

## 2024-07-21 RX ORDER — ACETAMINOPHEN 650 MG/1
650 SUPPOSITORY RECTAL EVERY 6 HOURS PRN
Status: DISCONTINUED | OUTPATIENT
Start: 2024-07-21 | End: 2024-07-23 | Stop reason: HOSPADM

## 2024-07-21 RX ORDER — ONDANSETRON 2 MG/ML
4 INJECTION INTRAMUSCULAR; INTRAVENOUS EVERY 6 HOURS PRN
Status: DISCONTINUED | OUTPATIENT
Start: 2024-07-21 | End: 2024-07-23 | Stop reason: HOSPADM

## 2024-07-21 RX ORDER — FUROSEMIDE 20 MG/1
20 TABLET ORAL
Status: DISCONTINUED | OUTPATIENT
Start: 2024-07-22 | End: 2024-07-21 | Stop reason: ALTCHOICE

## 2024-07-21 RX ORDER — SODIUM CHLORIDE 9 MG/ML
INJECTION, SOLUTION INTRAVENOUS PRN
Status: DISCONTINUED | OUTPATIENT
Start: 2024-07-21 | End: 2024-07-23 | Stop reason: HOSPADM

## 2024-07-21 RX ORDER — INSULIN LISPRO 100 [IU]/ML
0-4 INJECTION, SOLUTION INTRAVENOUS; SUBCUTANEOUS NIGHTLY
Status: DISCONTINUED | OUTPATIENT
Start: 2024-07-21 | End: 2024-07-23 | Stop reason: HOSPADM

## 2024-07-21 RX ORDER — DEXTROSE MONOHYDRATE 100 MG/ML
INJECTION, SOLUTION INTRAVENOUS CONTINUOUS PRN
Status: DISCONTINUED | OUTPATIENT
Start: 2024-07-21 | End: 2024-07-23 | Stop reason: HOSPADM

## 2024-07-21 RX ORDER — PRAVASTATIN SODIUM 40 MG
40 TABLET ORAL NIGHTLY
Status: DISCONTINUED | OUTPATIENT
Start: 2024-07-21 | End: 2024-07-23 | Stop reason: HOSPADM

## 2024-07-21 RX ORDER — POLYETHYLENE GLYCOL 3350 17 G/17G
17 POWDER, FOR SOLUTION ORAL DAILY PRN
Status: DISCONTINUED | OUTPATIENT
Start: 2024-07-21 | End: 2024-07-23 | Stop reason: HOSPADM

## 2024-07-21 RX ORDER — SODIUM CHLORIDE, SODIUM LACTATE, POTASSIUM CHLORIDE, AND CALCIUM CHLORIDE .6; .31; .03; .02 G/100ML; G/100ML; G/100ML; G/100ML
1000 INJECTION, SOLUTION INTRAVENOUS ONCE
Status: COMPLETED | OUTPATIENT
Start: 2024-07-21 | End: 2024-07-21

## 2024-07-21 RX ORDER — ACETAMINOPHEN 325 MG/1
650 TABLET ORAL EVERY 6 HOURS PRN
Status: DISCONTINUED | OUTPATIENT
Start: 2024-07-21 | End: 2024-07-23 | Stop reason: HOSPADM

## 2024-07-21 RX ADMIN — SODIUM CHLORIDE: 9 INJECTION, SOLUTION INTRAVENOUS at 16:07

## 2024-07-21 RX ADMIN — SODIUM CHLORIDE, POTASSIUM CHLORIDE, SODIUM LACTATE AND CALCIUM CHLORIDE 1000 ML: 600; 310; 30; 20 INJECTION, SOLUTION INTRAVENOUS at 14:36

## 2024-07-21 RX ADMIN — PRAVASTATIN SODIUM 40 MG: 40 TABLET ORAL at 22:56

## 2024-07-21 RX ADMIN — SODIUM CHLORIDE: 9 INJECTION, SOLUTION INTRAVENOUS at 17:24

## 2024-07-21 RX ADMIN — SODIUM CHLORIDE, PRESERVATIVE FREE 10 ML: 5 INJECTION INTRAVENOUS at 21:28

## 2024-07-21 RX ADMIN — WATER 1000 MG: 1 INJECTION INTRAMUSCULAR; INTRAVENOUS; SUBCUTANEOUS at 15:43

## 2024-07-21 NOTE — H&P
V2.0  History and Physical      Name:  Betsy Warner /Age/Sex: 1965  (59 y.o. female)   MRN & CSN:  [id number] & [id number] Encounter Date/Time: 2024 4:08 PM EDT   Location:  [place] [address] PCP: Delilah Encarnacion MD       Hospital Day: 1    Assessment and Plan:   Betsy Warner is a 59 y.o. female with a pmh of metastatic breast cancer, on chemotherapy, essential hypertension, DM-2, chemotherapy induced cardiomyopathy who presents with Acute renal failure (ARF) ([facility])    Hospital Problems             Last Modified POA    * (Principal) Acute renal failure (ARF) ([facility]) 2024 Yes   #Acute renal failure, poor oral intake with current chemotherapy and diarrhea  #Chemotherapy-induced pancytopenia  #UTI  #DM-2  #Essential hypertension with borderline blood pressure  #Morbid obesity with BMI of 40  #Chemotherapy-induced cardiomyopathy.  EF has improved from 15% to 50%    Plan:  Continue on IV hydration  Monitor renal function and electrolytes  Nephrology consulted by ED  Continue on current IV antibiotics and follow urine cultures  Hold Aldactone, Lasix, metformin, NSAIDs due to PRASHANT  Hold Coreg given soft blood pressures  Supportive therapy    Disposition:   Current Living situation: Home  Expected Disposition: Home  Estimated D/C: 2-3 days    Diet ADULT DIET; Regular; 3 carb choices (45 gm/meal); Low Fat/Low Chol/High Fiber/2 gm Na   DVT Prophylaxis [] Lovenox, []  Heparin, [x] SCDs, [] Ambulation,  [] Eliquis, [] Xarelto, [] Coumadin   Code Status Full   Surrogate Decision Maker/ POA Not decided yet     Personally reviewed Lab Studies are CBC, BMP and Imaging     Discussed management of the case with ED provider who recommended admission for further management    EKG interpreted personally and results normal sinus rhythm, VR = 91, QTc = 489, RBBB, chronic T wave inversion in V3, no acute ST-T changes.  Unchanged from EKG on 2017    No imaging studies to review    Drugs that require monitoring for

## 2024-07-21 NOTE — ED PROVIDER NOTES
General: No focal deficit present.      Mental Status: She is alert and oriented to person, place, and time. Mental status is at baseline.      Cranial Nerves: No cranial nerve deficit.      Sensory: No sensory deficit.      Motor: No weakness.   Psychiatric:         Mood and Affect: Mood normal.         Behavior: Behavior normal.         Thought Content: Thought content normal.                    Maida Telles MD  07/21/24 [id number]

## 2024-07-22 LAB
ANION GAP SERPL CALCULATED.3IONS-SCNC: 9 MMOL/L (ref 3–16)
BASOPHILS # BLD: 0 K/UL (ref 0–0.2)
BASOPHILS NFR BLD: 2 %
BUN SERPL-MCNC: 34 MG/DL (ref 7–20)
CALCIUM SERPL-MCNC: 8.9 MG/DL (ref 8.3–10.6)
CHLORIDE SERPL-SCNC: 105 MMOL/L (ref 99–110)
CHLORIDE UR-SCNC: 84 MMOL/L
CK SERPL-CCNC: 53 U/L (ref 26–192)
CO2 SERPL-SCNC: 23 MMOL/L (ref 21–32)
CREAT SERPL-MCNC: 2.7 MG/DL (ref 0.6–1.2)
DEPRECATED RDW RBC AUTO: 14.8 % (ref 12.4–15.4)
EKG ATRIAL RATE: 91 BPM
EKG DIAGNOSIS: NORMAL
EKG P AXIS: 51 DEGREES
EKG P-R INTERVAL: 186 MS
EKG Q-T INTERVAL: 398 MS
EKG QRS DURATION: 148 MS
EKG QTC CALCULATION (BAZETT): 489 MS
EKG R AXIS: -47 DEGREES
EKG T AXIS: 6 DEGREES
EKG VENTRICULAR RATE: 91 BPM
EOSINOPHIL # BLD: 0.1 K/UL (ref 0–0.6)
EOSINOPHIL NFR BLD: 3.6 %
EST. AVERAGE GLUCOSE BLD GHB EST-MCNC: 116.9 MG/DL
GFR SERPLBLD CREATININE-BSD FMLA CKD-EPI: 19 ML/MIN/{1.73_M2}
GLUCOSE BLD-MCNC: 104 MG/DL (ref 70–99)
GLUCOSE BLD-MCNC: 104 MG/DL (ref 70–99)
GLUCOSE BLD-MCNC: 116 MG/DL (ref 70–99)
GLUCOSE BLD-MCNC: 94 MG/DL (ref 70–99)
GLUCOSE SERPL-MCNC: 91 MG/DL (ref 70–99)
HBA1C MFR BLD: 5.7 %
HCT VFR BLD AUTO: 24 % (ref 36–48)
HGB BLD-MCNC: 8.2 G/DL (ref 12–16)
LDH SERPL L TO P-CCNC: 161 U/L (ref 100–190)
LYMPHOCYTES # BLD: 0.9 K/UL (ref 1–5.1)
LYMPHOCYTES NFR BLD: 42 %
MCH RBC QN AUTO: 32.3 PG (ref 26–34)
MCHC RBC AUTO-ENTMCNC: 33.9 G/DL (ref 31–36)
MCV RBC AUTO: 95 FL (ref 80–100)
MONOCYTES # BLD: 0.2 K/UL (ref 0–1.3)
MONOCYTES NFR BLD: 7 %
NEUTROPHILS # BLD: 1 K/UL (ref 1.7–7.7)
NEUTROPHILS NFR BLD: 45.4 %
PERFORMED ON: ABNORMAL
PERFORMED ON: NORMAL
PLATELET # BLD AUTO: 79 K/UL (ref 135–450)
PMV BLD AUTO: 7.2 FL (ref 5–10.5)
POTASSIUM SERPL-SCNC: 4.5 MMOL/L (ref 3.5–5.1)
POTASSIUM SERPL-SCNC: 4.5 MMOL/L (ref 3.5–5.1)
RBC # BLD AUTO: 2.53 M/UL (ref 4–5.2)
SODIUM SERPL-SCNC: 137 MMOL/L (ref 136–145)
SODIUM UR-SCNC: 95 MMOL/L
WBC # BLD AUTO: 2.2 K/UL (ref 4–11)

## 2024-07-22 PROCEDURE — 84156 ASSAY OF PROTEIN URINE: CPT

## 2024-07-22 PROCEDURE — 80048 BASIC METABOLIC PNL TOTAL CA: CPT

## 2024-07-22 PROCEDURE — 2580000003 HC RX 258: Performed by: INTERNAL MEDICINE

## 2024-07-22 PROCEDURE — 83010 ASSAY OF HAPTOGLOBIN QUANT: CPT

## 2024-07-22 PROCEDURE — 82570 ASSAY OF URINE CREATININE: CPT

## 2024-07-22 PROCEDURE — 82436 ASSAY OF URINE CHLORIDE: CPT

## 2024-07-22 PROCEDURE — 82550 ASSAY OF CK (CPK): CPT

## 2024-07-22 PROCEDURE — 36591 DRAW BLOOD OFF VENOUS DEVICE: CPT

## 2024-07-22 PROCEDURE — 6370000000 HC RX 637 (ALT 250 FOR IP): Performed by: INTERNAL MEDICINE

## 2024-07-22 PROCEDURE — 83615 LACTATE (LD) (LDH) ENZYME: CPT

## 2024-07-22 PROCEDURE — 85025 COMPLETE CBC W/AUTO DIFF WBC: CPT

## 2024-07-22 PROCEDURE — 84300 ASSAY OF URINE SODIUM: CPT

## 2024-07-22 PROCEDURE — 2580000003 HC RX 258: Performed by: STUDENT IN AN ORGANIZED HEALTH CARE EDUCATION/TRAINING PROGRAM

## 2024-07-22 PROCEDURE — 83036 HEMOGLOBIN GLYCOSYLATED A1C: CPT

## 2024-07-22 PROCEDURE — 6370000000 HC RX 637 (ALT 250 FOR IP): Performed by: FAMILY MEDICINE

## 2024-07-22 PROCEDURE — 82043 UR ALBUMIN QUANTITATIVE: CPT

## 2024-07-22 PROCEDURE — 2060000000 HC ICU INTERMEDIATE R&B

## 2024-07-22 RX ORDER — SODIUM CHLORIDE, SODIUM LACTATE, POTASSIUM CHLORIDE, CALCIUM CHLORIDE 600; 310; 30; 20 MG/100ML; MG/100ML; MG/100ML; MG/100ML
INJECTION, SOLUTION INTRAVENOUS CONTINUOUS
Status: DISCONTINUED | OUTPATIENT
Start: 2024-07-22 | End: 2024-07-23 | Stop reason: HOSPADM

## 2024-07-22 RX ORDER — GABAPENTIN 300 MG/1
300 CAPSULE ORAL 2 TIMES DAILY
COMMUNITY

## 2024-07-22 RX ORDER — MIDODRINE HYDROCHLORIDE 5 MG/1
5 TABLET ORAL ONCE
Status: COMPLETED | OUTPATIENT
Start: 2024-07-22 | End: 2024-07-22

## 2024-07-22 RX ORDER — MIRTAZAPINE 15 MG/1
7.5 TABLET, FILM COATED ORAL EVERY MORNING
COMMUNITY

## 2024-07-22 RX ORDER — TRAZODONE HYDROCHLORIDE 50 MG/1
50 TABLET ORAL NIGHTLY
COMMUNITY

## 2024-07-22 RX ADMIN — SODIUM CHLORIDE: 9 INJECTION, SOLUTION INTRAVENOUS at 01:54

## 2024-07-22 RX ADMIN — ANASTROZOLE 1 MG: 1 TABLET, COATED ORAL at 08:36

## 2024-07-22 RX ADMIN — PRAVASTATIN SODIUM 40 MG: 40 TABLET ORAL at 20:01

## 2024-07-22 RX ADMIN — SODIUM CHLORIDE, POTASSIUM CHLORIDE, SODIUM LACTATE AND CALCIUM CHLORIDE: 600; 310; 30; 20 INJECTION, SOLUTION INTRAVENOUS at 12:07

## 2024-07-22 RX ADMIN — SODIUM CHLORIDE, PRESERVATIVE FREE 20 ML: 5 INJECTION INTRAVENOUS at 11:40

## 2024-07-22 RX ADMIN — MIDODRINE HYDROCHLORIDE 5 MG: 5 TABLET ORAL at 03:44

## 2024-07-22 ASSESSMENT — ENCOUNTER SYMPTOMS: DIARRHEA: 1

## 2024-07-22 NOTE — CARE COORDINATION
Case Management Assessment  Initial Evaluation    Date/Time of Evaluation: 7/22/2024 10:27 AM  Assessment Completed by: TAMI Brink   for [facility] and [employer] [facility] ([facility])  [place] Mobile: [phone]    If patient is discharged prior to next notation, then this note serves as note for discharge by case management.    Patient Name: Betsy Warner                   YOB: 1965  Diagnosis: Acute renal failure (ARF) (HCC) [N17.9]  Acute renal failure, unspecified acute renal failure type (HCC) [N17.9]                   Date / Time: 7/21/2024  1:47 PM    Patient Admission Status: Inpatient   Readmission Risk (Low < 19, Mod (19-27), High > 27): Readmission Risk Score: 14.2    Current PCP: Delilah Encarnacion MD  PCP verified by CM? Yes    Chart Reviewed: Yes      History Provided by: Patient  Patient Orientation: Alert and Oriented    Patient Cognition: Alert    Hospitalization in the last 30 days (Readmission):  No    If yes, Readmission Assessment in CM Navigator will be completed.    Advance Directives:      Code Status: Full Code   Patient's Primary Decision Maker is: Legal Next of Kin    Primary Decision Maker: Tony Warner - Child - [phone]    Discharge Planning:    Patient lives with: Alone Type of Home: House  Primary Care Giver: Self  Patient Support Systems include: Family Members   Current Financial resources: Other (Comment) (Anil [facility])  Current community resources: None  Current services prior to admission: Durable Medical Equipment            Current DME: Cane (she doesn't use the Cane)            Type of Home Care services:  None    ADLS  Prior functional level: Independent in ADLs/IADLs  Current functional level: Independent in ADLs/IADLs    PT AM-PAC:   /24  OT AM-PAC:   /24    Family can provide assistance at DC: Yes  Would you like Case Management to discuss the discharge plan with any other family members/significant others, and if

## 2024-07-22 NOTE — PLAN OF CARE
Problem: Skin/Tissue Integrity - Adult  Goal: Skin integrity remains intact  Outcome: Progressing  Flowsheets (Taken 7/21/2024 2158)  Skin Integrity Remains Intact:   Monitor for areas of redness and/or skin breakdown   Assess vascular access sites hourly   Every 4-6 hours minimum: Change oxygen saturation probe site  Note: No skin breakdown noted. Skin remained clean, dry and intact.     Problem: Safety - Adult  Goal: Free from fall injury  Outcome: Progressing  Flowsheets (Taken 7/21/2024 2158)  Free From Fall Injury:   Instruct family/caregiver on patient safety   Based on caregiver fall risk screen, instruct family/caregiver to ask for assistance with transferring infant if caregiver noted to have fall risk factors  Note: Orthostatic vital signs obtained at start of shift - see flowsheet for details.  Pt does not meet criteria for orthostasis.  Pt is a Med fall risk. See Portillo Fall Score and ABCDS Injury Risk assessments.   - Screening for Orthostasis AND not a [place] Risk per PORTILLO/ABCDS: Pt bed is in low position, side rails up, call light and belongings are in reach.  Fall risk light is on outside pts room.  Pt encouraged to call for assistance as needed. Will continue with hourly rounds for PO intake, pain needs, toileting and repositioning as needed.      Problem: Metabolic/Fluid and Electrolytes - Adult  Goal: Hemodynamic stability and optimal renal function maintained  Outcome: Progressing  Flowsheets (Taken 7/21/2024 2158)  Hemodynamic stability and optimal renal function maintained:   Monitor labs and assess for signs and symptoms of volume excess or deficit   Monitor intake, output and patient weight   Monitor response to interventions for patient's volume status, including labs, urine output, blood pressure (other measures as available)   Encourage oral intake as appropriate

## 2024-07-23 VITALS
HEART RATE: 92 BPM | TEMPERATURE: 98 F | OXYGEN SATURATION: 98 % | RESPIRATION RATE: 14 BRPM | DIASTOLIC BLOOD PRESSURE: 70 MMHG | WEIGHT: 236.6 LBS | BODY MASS INDEX: 39.42 KG/M2 | SYSTOLIC BLOOD PRESSURE: 130 MMHG | HEIGHT: 65 IN

## 2024-07-23 LAB
ANION GAP SERPL CALCULATED.3IONS-SCNC: 7 MMOL/L (ref 3–16)
BUN SERPL-MCNC: 21 MG/DL (ref 7–20)
CALCIUM SERPL-MCNC: 9.1 MG/DL (ref 8.3–10.6)
CHLORIDE SERPL-SCNC: 111 MMOL/L (ref 99–110)
CO2 SERPL-SCNC: 24 MMOL/L (ref 21–32)
CREAT SERPL-MCNC: 1.9 MG/DL (ref 0.6–1.2)
CREAT UR-MCNC: 38.6 MG/DL (ref 28–259)
GFR SERPLBLD CREATININE-BSD FMLA CKD-EPI: 30 ML/MIN/{1.73_M2}
GLUCOSE BLD-MCNC: 101 MG/DL (ref 70–99)
GLUCOSE BLD-MCNC: 92 MG/DL (ref 70–99)
GLUCOSE SERPL-MCNC: 91 MG/DL (ref 70–99)
HAPTOGLOB SERPL-MCNC: 171 MG/DL (ref 30–200)
MICROALBUMIN UR DL<=1MG/L-MCNC: 1.5 MG/DL
MICROALBUMIN/CREAT UR: 38.9 MG/G (ref 0–30)
PERFORMED ON: ABNORMAL
PERFORMED ON: NORMAL
POTASSIUM SERPL-SCNC: 4.1 MMOL/L (ref 3.5–5.1)
PROT UR-MCNC: 8 MG/DL
PROT/CREAT UR-RTO: 0.2 MG/DL
SODIUM SERPL-SCNC: 142 MMOL/L (ref 136–145)

## 2024-07-23 PROCEDURE — 80048 BASIC METABOLIC PNL TOTAL CA: CPT

## 2024-07-23 PROCEDURE — 6370000000 HC RX 637 (ALT 250 FOR IP): Performed by: INTERNAL MEDICINE

## 2024-07-23 PROCEDURE — 2580000003 HC RX 258: Performed by: INTERNAL MEDICINE

## 2024-07-23 RX ADMIN — SODIUM CHLORIDE, PRESERVATIVE FREE 10 ML: 5 INJECTION INTRAVENOUS at 07:49

## 2024-07-23 RX ADMIN — ANASTROZOLE 1 MG: 1 TABLET, COATED ORAL at 07:48

## 2024-07-23 NOTE — PLAN OF CARE
Problem: Skin/Tissue Integrity - Adult  Goal: Skin integrity remains intact  7/23/2024 0937 by Ron Jimenez RN  Outcome: Progressing    Problem: Safety - Adult  Goal: Free from fall injury  7/23/2024 0937 by Ron Jimenez RN  Outcome: Progressing     Problem: Metabolic/Fluid and Electrolytes - Adult  Goal: Hemodynamic stability and optimal renal function maintained  7/23/2024 0937 by Ron Jimenez RN  Outcome: Progressing  Flowsheets (Taken 7/23/2024 0744)  Hemodynamic stability and optimal renal function maintained: Monitor response to interventions for patient's volume status, including labs, urine output, blood pressure (other measures as available)     Problem: ABCDS Injury Assessment  Goal: Absence of physical injury  Outcome: Progressing

## 2024-07-23 NOTE — DISCHARGE INSTRUCTIONS
Hold your coreg, spironolactone and entresto. Please contact your nephrologist office when your BP increases and they will add back in these medications    Hold your jardiance and metformin till you speak with your primary care provider.

## 2024-07-23 NOTE — DISCHARGE SUMMARY
V2.0  Discharge Summary    Name:  Betsy Warner /Age/Sex: 1965 (59 y.o. female)   Admit Date: 2024  Discharge Date: 24    MRN & CSN:  [id number] & [id number] Encounter Date and Time 24 1:15 PM EDT    Attending:  Vinod Slade MD Discharging Provider: KRIS Aaron [facility] Course:     Brief HPI: Betsy Warner is a 59 y.o. female who presented to the emergency room due to abnormal labs.  Patient was found to have a creatinine of 3.8 during oncology visit.  She has been having diarrhea with poor oral intake and felt dehydrated weak.    In the emergency room patient was found to be pancytopenic at her baseline, BUN 43 and creatinine 3.4.  Patient was admitted and nephrology was consulted    Brief Problem Based Course:   Acute kidney injury-baseline creatinine per nephrology note was 1.06 in 2022.  Was 3.4 on admission.  Due to GI losses and resulting hypotension.  Nephrology consulted.  Home blood pressure medications held patient received IV fluids.  Creatinine improved.  Chemotherapy-induced pancytopenia-oncology consulted  Type 2 diabetes-hold oral medications and treat with sliding scale insulin.  Recommended patient contact primary care provider concerning restart of Jardiance and metformin  Hypertension-patient with hypotension on admission.  Blood pressure medications held.  Patient instructed to contact nephrology when blood pressure starts to increase for reintroduction of antihypertensives  Diffuse metastatic breast cancer-oncology consulted.  Outpatient decision concerning continued treatment      The patient expressed appropriate understanding of, and agreement with the discharge recommendations, medications, and plan.     Consults this admission:  IP CONSULT TO NEPHROLOGY  IP CONSULT TO ONCOLOGY    Discharge Diagnosis:   Acute renal failure (ARF) (HCC)        Discharge Instruction:   Follow up appointments: Follow-up with nephrology and

## 2024-07-23 NOTE — PROGRESS NOTES
07/22/24 1407   Encounter Summary   Encounter Overview/Reason Initial Encounter   Service Provided For Patient   Referral/Consult From Rounding   Last Encounter  07/22/24  ([facility])   Complexity of Encounter Low   Begin Time 1401   End Time  1408   Total Time Calculated 7 min   Assessment/Intervention/Outcome   Assessment Coping;Passive   Intervention Active listening;Nurtured Hope   Outcome Connection/Belonging;Expressed Gratitude     Student elisabeth Marrero  
4 Eyes Skin Assessment     NAME:  Betsy Warner  YOB: 1965  MEDICAL RECORD NUMBER:  [id number]    The patient is being assessed for  Admission    I agree that at least one RN has performed a thorough Head to Toe Skin Assessment on the patient. ALL assessment sites listed below have been assessed.      Areas assessed by both nurses:    Head, Face, Ears and Shoulders, Back, Chest        Does the Patient have a Wound? No noted wound(s)       Selvin Prevention initiated by RN: No  Wound Care Orders initiated by RN: No    Pressure Injury (Stage 3,4, Unstageable, DTI, NWPT, and Complex wounds) if present, place Wound referral order by RN under : No    New Ostomies, if present place, Ostomy referral order under : No     Nurse 1 eSignature: Electronically signed by Astrid Ann RN on 7/21/24 at 6:31 PM EDT    **SHARE this note so that the co-signing nurse can place an eSignature**    Nurse 2 eSignature: Electronically signed by Manda Lin RN on 7/21/24 at 6:42 PM EDT   
Patient admitted to [facility] via wheelchair from ED for diagnosis of PRASHANT.        Estimated body surface area is 2.22 meters squared as calculated from the following:    Height as of this encounter: 1.651 m (5' 5\").    Weight as of this encounter: 107 kg (236 lb). verified.  Appropriate dosing calculations of chemotherapy based on above height, weight, and BSA verified.      Patient and oriented to patient room including call light and bed controls.  Admission assessment completed - see admission flowsheet documentation.  Patient is a low fall risk.  Safety measures instituted per policy.        Patient and  oriented to unit policies and procedures including: pain management practices, unit safety precautions, family rapid response, q4h vital signs and assessments. Also discussed use of call light and how to get in touch with nursing staff.   Patient verbalizes understanding of all instructions and will call for assistance as needed.  
Patient seen and assessed for standard line care needs.  CVC site remains free of visible signs and symptoms of infection. No drainage, edema, erythema, pain, itching or warmth noted at and around the insertion site.  Line care performed by Lulu Coburn RN.  The need for continued use of the CVC is due to ongoing therapy.  Patient verbalizes understanding of line care education provided by line care RN.  Staff RNs will continue to monitor and assess the CVC site throughout the patient's hospital stay.  Sterile dressing changes will continue to be changed per policy.    Lulu Coburn RN  
Reviewed discharge instructions with patient.  Reviewed discharge medications including dosing, schedule, indication, and adverse reactions.  Reviewed which medications were already taken today and next dosage due for each medication.      Patient is being discharged with IV access d/t need for ongoing therapy:      Type:  PAC                          Patient verbalized understanding of all instructions and questions were answered to her. satisfaction. Discharge instructions were given to the patient. Patient discharged to home per self.  HARSHAD TOUSSAINT RN   
T wave inversion in V3, no acute ST-T changes.  Unchanged from EKG on 12/12/2017     No imaging studies to review     Drugs that require monitoring for toxicity include insulin for hypoglycemia and the method of monitoring was frequent blood glucose monitoring           Subjective:     Chief Complaint:     Betsy Warner is a 59 y.o. female who presents with  pmh as mentioned above was referred to ED from [facility] due to abnormal lab-serum creatinine of 3.4.  Patient has history of breast cancer, metastatic and is on chemotherapy.  Over the last several months patient has been having progressive renal failure.  Today during her oncology clinic they noted that her creatinine has increased from 2 to greater than 3.  Patient was given fluids and has advised to come to ED.       Review of Systems:      Pertinent positives and negatives discussed in HPI    Objective:     Intake/Output Summary (Last 24 hours) at 7/22/2024 1252  Last data filed at 7/22/2024 1200  Gross per 24 hour   Intake 2157 ml   Output 2750 ml   Net -593 ml      Vitals:   Vitals:    07/22/24 0308 07/22/24 0507 07/22/24 0826 07/22/24 1200   BP: (!) 83/47 (!) 93/50 (!) 95/58 (!) 103/56   Pulse: 93  87 94   Resp: 12  14 18   Temp: 97.4 °F (36.3 °C)  98 °F (36.7 °C) 97.9 °F (36.6 °C)   TempSrc: Oral  Oral Oral   SpO2: 95%  100% 98%   Weight:   108.4 kg (239 lb)    Height:             Physical Exam:      General: NAD  Eyes: EOMI  ENT: neck supple  Cardiovascular: Regular rate.  Respiratory: Clear to auscultation  Gastrointestinal: Soft, non tender  Genitourinary: no suprapubic tenderness  Musculoskeletal: No edema  Skin: warm, dry  Neuro: Alert.  Psych: Mood appropriate.         Medications:   Medications:    [Held by provider] carvedilol  25 mg Oral BID    sodium chloride flush  5-40 mL IntraVENous 2 times per day    insulin lispro  0-4 Units SubCUTAneous TID     insulin lispro  0-4 Units SubCUTAneous Nightly    anastrozole  1 mg Oral Daily    pravastatin  40

## 2024-07-23 NOTE — CARE COORDINATION
Addendum at 12:39pm: Discharge order noted. CM/SW not following. No discharge needs identified.     10:26am: Spoke with pt at bedside. Pt confirmed she will return home when discharged. She stated she won't need skilled home care for RN/PT/OT and will be driving herself home. She states she will be leaving today.     97% on room air per vitals in epic.     SW/CM no longer following. Please consult if needs arise.     Suma DALTON, TAMI   for [facility] ([facility])  [facility] Mobile: [phone]

## 2024-07-23 NOTE — PLAN OF CARE
Problem: Skin/Tissue Integrity - Adult  Goal: Skin integrity remains intact  Outcome: Progressing  Skin Integrity Remains Intact: Monitor for areas of redness and/or skin breakdown  Note: Pt UAL, ortho neg. Pt repositioned self in bed through out the night. No new skin breakdown noted during this shift.      Problem: Safety - Adult  Goal: Free from fall injury  Outcome: Progressing  Free From Fall Injury: Instruct family/caregiver on patient safety  Note: Orthostatic vital signs obtained at start of shift - see flowsheet for details.  Pt meets criteria for orthostasis.  Pt is a Low fall risk. See Portillo Fall Score and ABCDS Injury Risk assessments.   - Screening for Orthostasis AND not a [place] Risk per PORTILLO/ABCDS: Pt bed is in low position, side rails up, call light and belongings are in reach.  Fall risk light is on outside pts room.  Pt encouraged to call for assistance as needed. Will continue with hourly rounds for PO intake, pain needs, toileting and repositioning as needed.       Problem: Metabolic/Fluid and Electrolytes - Adult  Goal: Hemodynamic stability and optimal renal function maintained  Outcome: Progressing  Hemodynamic stability and optimal renal function maintained: Monitor response to interventions for patient's volume status, including labs, urine output, blood pressure (other measures as available)  Note: Creatinine decreased to 1.9

## 2024-07-23 NOTE — CONSULTS
[facility]    Consult Note      Requesting Physician:  Zeb Turner    CHIEF COMPLAINT:  PRASHANT      HISTORY OF PRESENT ILLNESS:    Ms. Warner  is a 59 y.o. female we are seeing in consultation for Metastatic Breast Cancer.     She was seen in the weekend clinic yesterday for IVF as her Cr was noted to be elevated to 2.6 after hours on Friday. Yesterday it had again increased to 3.8. She was directed to the ED for evaluation.     She reports that she was continuing to have diarrhea, although intermittent. She takes Immodium but then does not have BM for several days. She then will have a lot of diarrhea following. She also reports that sometimes she has poor PO intake due to taste changes and generalized malaise.     Today she is feeling well and asking when she can go home.     Past Medical History:  Past Medical History:   Diagnosis Date    Allergic     Arthritis     Breast cancer (HCC)     , Radiation, Chemo    CAD (coronary artery disease)     cardiomyopathy    CHF (congestive heart failure) (HCC)     Diabetes mellitus (HCC)     No medications    Heart disease     Hx of blood clots     blood clot at PICC line site    ICD (implantable cardioverter-defibrillator) in place     Metastatic disease (HCC)     Mitral regurgitation     Nonischemic cardiomyopathy (HCC)     RADHA (obstructive sleep apnea)     no cpap, had several years ago but didn't use    PONV (postoperative nausea and vomiting)     nausea; no vomiting       Past Surgical History:  Past Surgical History:   Procedure Laterality Date    BREAST LUMPECTOMY Left     lymph node dissection     CARDIAC DEFIBRILLATOR PLACEMENT Left      SECTION      COLONOSCOPY  2017    DILATION AND CURETTAGE OF UTERUS      HYSTERECTOMY (CERVIX STATUS UNKNOWN)  2017    TOTAL ABDOMINAL HYSTERECTOMY, BILATERAL 
Thank you for consulting [facility] in the care of your patient.  60 y/o F w/ PMHx of Met Breast Ca on Chemotherapy.  SCr on admission is 3.4 but lytes are stable with bicarb of 24 and K of 4.8.  No recent labs in EPIC or Care Everywhere but reported SCr has been worsening over several months since starting Chemo with diarrhea.  BP on admission is 86/48.  She was given fluids by Oncology and came to ED.      PRASHANT  Etiology likely pre-renal with hypotension and volume loss.  Given several fluid boluses and placed on IVF.   Check urine studies.   Certainly chemo may have been contributing as well.  She is on Ibrance / Palbociclib and Arimidex  CDK  4/6 inhibitors are associated with SCr elevation by inhibiting tubular secretory transporters / pseudo-acute kidney injury.    Ibrance can also cause true PRASHANT with Fanconi syndrome and distal RTA.    CT on 7/12/24 without contrast showed no evidence of progressing metastatic disease.  Check renal ultrasound.   Given low BP, holding Coreg, Lasix, Lisinopril, Aldactone.   Appears she was on Advil 200 mg every evening also on hold.   If renal function does not improve, may need a renal biopsy to verify cause and rule in/out Palbociclib to determine treatment course.      Patient will be seen and full consult in AM but if you have any questions I can be reached through our office at [phone] or through [employer].  Thank you.  Dr. Dejan Frazier  
gradient is 6mm Hg.   - Mitral valve: There is mild regurgitation.   - Inferior vena cava: The IVC is normal-sized. The IVC is normal-sized.     Respirophasic diameter changes are in the normal range (> 50%), consistent     with normal central venous pressure.       Known Cirrhosis: No  Current infection or sepsis: No  TMA suspected: No    PRASHANT appear to be due to IV volume depletion and hemodynamics.  Not sure how much is chemotherapy is contributing to PRASHANT      Metastatic breast cancer.       Pancytopenia.           HPI      Patient is a 59 y.o. female  with PMH significant for  metastatic breast cancer on chemotherapy, CAD, CHF, DM was admitted with worsening Cr and diarrhea.       Patient seen at bedside and appear comfortable on room air and denied SOB, urinary symptoms, cough or hemoptysis, recent infection or antibiotics use, recent fall or trauma, any rash. No nausea, vomiting or abdominal pain.       Patient asking when she can go home today.       ROS:   Negative except as mentioned in HPI      Vitals:     Vitals:    07/22/24 0826   BP: (!) 95/58   Pulse: 87   Resp: 14   Temp: 98 °F (36.7 °C)   SpO2: 100%         Physical Examination:     General appearance: NAD. Alert, oriented  Respiratory: No respiratory distress on room air.  No wheezing.   Cardiovascular: Edema trace.   Abdomen: Soft. Non distended and non tender.   Other relevant findings:       Medications:       [Held by provider] carvedilol  25 mg Oral BID    sodium chloride flush  5-40 mL IntraVENous 2 times per day    insulin lispro  0-4 Units SubCUTAneous TID     insulin lispro  0-4 Units SubCUTAneous Nightly    anastrozole  1 mg Oral Daily    pravastatin  40 mg Oral Nightly         Labs:     Lab Results   Component Value Date    CREATININE 2.7 (H) 07/22/2024    BUN 34 (H) 07/22/2024     07/22/2024    K 4.5 07/22/2024    K 4.5 07/22/2024     07/22/2024    CO2 23 07/22/2024    CALCIUM 8.9 07/22/2024     Lab Results   Component Value 
anastrozole  1 mg Oral Daily    pravastatin  40 mg Oral Nightly         Labs:     Lab Results   Component Value Date    CREATININE 1.9 (H) 2024    BUN 21 (H) 2024     2024    K 4.1 2024     (H) 2024    CO2 24 2024    CALCIUM 9.1 2024     Lab Results   Component Value Date    WBC 2.2 (L) 2024    HGB 8.2 (L) 2024    HCT 24.0 (L) 2024    MCV 95.0 2024    PLT 79 (L) 2024         Past Medical History:     Past Medical History:   Diagnosis Date    Allergic     Arthritis     Breast cancer (HCC)     , Radiation, Chemo    CAD (coronary artery disease)     cardiomyopathy    CHF (congestive heart failure) (HCC)     Diabetes mellitus (HCC)     No medications    Heart disease     Hx of blood clots     blood clot at PICC line site    ICD (implantable cardioverter-defibrillator) in place     Metastatic disease (HCC)     Mitral regurgitation     Nonischemic cardiomyopathy (HCC)     RADHA (obstructive sleep apnea)     no cpap, had several years ago but didn't use    PONV (postoperative nausea and vomiting)     nausea; no vomiting       Past Surgical History:     Past Surgical History:   Procedure Laterality Date    BREAST LUMPECTOMY Left     lymph node dissection     CARDIAC DEFIBRILLATOR PLACEMENT Left      SECTION      COLONOSCOPY  2017    DILATION AND CURETTAGE OF UTERUS      HYSTERECTOMY (CERVIX STATUS UNKNOWN)  2017    TOTAL ABDOMINAL HYSTERECTOMY, BILATERAL SALPINGO-OOPHORECTOMY LYSIS OF ADHESIONS    LYMPH NODE DISSECTION      OTHER SURGICAL HISTORY N/A 2017    attempted diagnostic hysteroscopy    PORT SURGERY Right 2020    RIGHT PORT PLACEMENT performed by Zaynab Lopez MD at [facility] OR

## 2024-10-04 ENCOUNTER — HOSPITAL ENCOUNTER (OUTPATIENT)
Dept: INTERVENTIONAL RADIOLOGY/VASCULAR | Age: 59
Discharge: HOME | End: 2024-10-06
Attending: INTERNAL MEDICINE
Payer: COMMERCIAL

## 2024-10-04 VITALS
BODY MASS INDEX: 38.32 KG/M2 | OXYGEN SATURATION: 98 % | HEART RATE: 90 BPM | HEIGHT: 65 IN | SYSTOLIC BLOOD PRESSURE: 111 MMHG | DIASTOLIC BLOOD PRESSURE: 63 MMHG | RESPIRATION RATE: 16 BRPM | WEIGHT: 230 LBS

## 2024-10-04 DIAGNOSIS — C50.819 OVERLAPPING MALIGNANT NEOPLASM OF FEMALE BREAST, UNSPECIFIED ESTROGEN RECEPTOR STATUS, UNSPECIFIED LATERALITY (HCC): ICD-10-CM

## 2024-10-04 LAB
DEPRECATED RDW RBC AUTO: 16.5 % (ref 12.4–15.4)
ECHO BSA: 2.19 M2
HCT VFR BLD AUTO: 36 % (ref 36–48)
HGB BLD-MCNC: 11.9 G/DL (ref 12–16)
INR PPP: 0.98 (ref 0.85–1.15)
MCH RBC QN AUTO: 29.5 PG (ref 26–34)
MCHC RBC AUTO-ENTMCNC: 33 G/DL (ref 31–36)
MCV RBC AUTO: 89.5 FL (ref 80–100)
PLATELET # BLD AUTO: 156 K/UL (ref 135–450)
PMV BLD AUTO: 7 FL (ref 5–10.5)
PROTHROMBIN TIME: 13.1 SEC (ref 11.9–14.9)
RBC # BLD AUTO: 4.02 M/UL (ref 4–5.2)
WBC # BLD AUTO: 2.8 K/UL (ref 4–11)

## 2024-10-04 PROCEDURE — 7100000011 HC PHASE II RECOVERY - ADDTL 15 MIN: Performed by: STUDENT IN AN ORGANIZED HEALTH CARE EDUCATION/TRAINING PROGRAM

## 2024-10-04 PROCEDURE — C1788 PORT, INDWELLING, IMP: HCPCS | Performed by: STUDENT IN AN ORGANIZED HEALTH CARE EDUCATION/TRAINING PROGRAM

## 2024-10-04 PROCEDURE — 36590 REMOVAL TUNNELED CV CATH: CPT | Performed by: STUDENT IN AN ORGANIZED HEALTH CARE EDUCATION/TRAINING PROGRAM

## 2024-10-04 PROCEDURE — 76937 US GUIDE VASCULAR ACCESS: CPT | Performed by: STUDENT IN AN ORGANIZED HEALTH CARE EDUCATION/TRAINING PROGRAM

## 2024-10-04 PROCEDURE — 2580000003 HC RX 258: Performed by: STUDENT IN AN ORGANIZED HEALTH CARE EDUCATION/TRAINING PROGRAM

## 2024-10-04 PROCEDURE — 85610 PROTHROMBIN TIME: CPT

## 2024-10-04 PROCEDURE — 6360000004 HC RX CONTRAST MEDICATION: Performed by: STUDENT IN AN ORGANIZED HEALTH CARE EDUCATION/TRAINING PROGRAM

## 2024-10-04 PROCEDURE — 99152 MOD SED SAME PHYS/QHP 5/>YRS: CPT | Performed by: STUDENT IN AN ORGANIZED HEALTH CARE EDUCATION/TRAINING PROGRAM

## 2024-10-04 PROCEDURE — 99153 MOD SED SAME PHYS/QHP EA: CPT | Performed by: STUDENT IN AN ORGANIZED HEALTH CARE EDUCATION/TRAINING PROGRAM

## 2024-10-04 PROCEDURE — C1894 INTRO/SHEATH, NON-LASER: HCPCS | Performed by: STUDENT IN AN ORGANIZED HEALTH CARE EDUCATION/TRAINING PROGRAM

## 2024-10-04 PROCEDURE — 36598 INJ W/FLUOR EVAL CV DEVICE: CPT | Performed by: STUDENT IN AN ORGANIZED HEALTH CARE EDUCATION/TRAINING PROGRAM

## 2024-10-04 PROCEDURE — 85027 COMPLETE CBC AUTOMATED: CPT

## 2024-10-04 PROCEDURE — C1769 GUIDE WIRE: HCPCS | Performed by: STUDENT IN AN ORGANIZED HEALTH CARE EDUCATION/TRAINING PROGRAM

## 2024-10-04 PROCEDURE — 6360000002 HC RX W HCPCS: Performed by: STUDENT IN AN ORGANIZED HEALTH CARE EDUCATION/TRAINING PROGRAM

## 2024-10-04 PROCEDURE — 36561 INSERT TUNNELED CV CATH: CPT

## 2024-10-04 PROCEDURE — 2500000003 HC RX 250 WO HCPCS: Performed by: STUDENT IN AN ORGANIZED HEALTH CARE EDUCATION/TRAINING PROGRAM

## 2024-10-04 PROCEDURE — 7100000010 HC PHASE II RECOVERY - FIRST 15 MIN: Performed by: STUDENT IN AN ORGANIZED HEALTH CARE EDUCATION/TRAINING PROGRAM

## 2024-10-04 RX ORDER — IOPAMIDOL 755 MG/ML
INJECTION, SOLUTION INTRAVASCULAR PRN
Status: COMPLETED | OUTPATIENT
Start: 2024-10-04 | End: 2024-10-04

## 2024-10-04 RX ORDER — HEPARIN 100 UNIT/ML
SYRINGE INTRAVENOUS PRN
Status: COMPLETED | OUTPATIENT
Start: 2024-10-04 | End: 2024-10-04

## 2024-10-04 RX ORDER — MIDAZOLAM HYDROCHLORIDE 1 MG/ML
INJECTION INTRAMUSCULAR; INTRAVENOUS PRN
Status: COMPLETED | OUTPATIENT
Start: 2024-10-04 | End: 2024-10-04

## 2024-10-04 RX ORDER — LIDOCAINE HYDROCHLORIDE 10 MG/ML
INJECTION, SOLUTION EPIDURAL; INFILTRATION; INTRACAUDAL; PERINEURAL PRN
Status: COMPLETED | OUTPATIENT
Start: 2024-10-04 | End: 2024-10-04

## 2024-10-04 RX ADMIN — FENTANYL CITRATE 25 MCG: 50 INJECTION, SOLUTION INTRAMUSCULAR; INTRAVENOUS at 09:20

## 2024-10-04 RX ADMIN — FENTANYL CITRATE 50 MCG: 50 INJECTION, SOLUTION INTRAMUSCULAR; INTRAVENOUS at 08:59

## 2024-10-04 RX ADMIN — MIDAZOLAM HYDROCHLORIDE 0.5 MG: 2 INJECTION, SOLUTION INTRAMUSCULAR; INTRAVENOUS at 09:07

## 2024-10-04 RX ADMIN — CEFAZOLIN SODIUM 1000 MG: 1 POWDER, FOR SOLUTION INTRAMUSCULAR; INTRAVENOUS at 08:56

## 2024-10-04 RX ADMIN — Medication 500 UNITS: at 09:25

## 2024-10-04 RX ADMIN — MIDAZOLAM HYDROCHLORIDE 0.5 MG: 2 INJECTION, SOLUTION INTRAMUSCULAR; INTRAVENOUS at 09:20

## 2024-10-04 RX ADMIN — IOPAMIDOL 5 ML: 755 INJECTION, SOLUTION INTRAVENOUS at 08:56

## 2024-10-04 RX ADMIN — LIDOCAINE HYDROCHLORIDE 5 ML: 10 INJECTION, SOLUTION EPIDURAL; INFILTRATION; INTRACAUDAL; PERINEURAL at 09:05

## 2024-10-04 RX ADMIN — FENTANYL CITRATE 25 MCG: 50 INJECTION, SOLUTION INTRAMUSCULAR; INTRAVENOUS at 09:06

## 2024-10-04 RX ADMIN — MIDAZOLAM HYDROCHLORIDE 1 MG: 2 INJECTION, SOLUTION INTRAMUSCULAR; INTRAVENOUS at 08:59

## 2024-10-04 RX ADMIN — LIDOCAINE HYDROCHLORIDE 5 ML: 10 INJECTION, SOLUTION EPIDURAL; INFILTRATION; INTRACAUDAL; PERINEURAL at 09:00

## 2024-10-04 NOTE — BRIEF OP NOTE
Interventional Radiology Post Procedure    Date: 10/4/2024    Physician: Dusty Todd MD MD    Pre-op Diagnosis: breast cancer and malfunctioning port    Post-op Diagnosis: same    Variation from Planned Procedure: None       Findings: right subclavian port with tip in the right brachiocephalic vein, in ability to flush, likely from fibrin sheath.  Successful removal of right subclavian port and placement of RIJ port using same pocket.    Patient condition: Stable    Estimated Blood Loss: less than 50 ml    Specimens:  None      Signed,  Dusty Todd MD  9:35 AM  10/4/2024

## 2024-10-04 NOTE — DISCHARGE INSTRUCTIONS
[facility]  Cardiovascular Special Procedures  IMPLANTABLE VENOUS ACCESS DEVICE      Patient Information:   Maintain dressing after the procedure for 24 - 48 hours, then remove dressing. Do not use any ointment. If there is any leakage at the incision site, notify your doctor.  Keep site clean and dry for 7 days and observe for any signs of infection.   Bruising and mild discomfort are expected. You may apply an ice bag to the incision area to minimize bruising, discomfort, and swelling.  Contact your physician who placed the Port for any bleeding or extreme pain. Other symptoms to report are as follows:     Fever.     Skin warm to touch.     Numbness or weakness.     Swelling of neck or arm.     Redness or Tenderness along the portal site and/or the catheter tract.     Drainage from the portal site, or if dressing is damp or saturated.   Refrain from heavy lifting for greater than 10 pounds for 10-14 days.  This is important while the incision is healing.  Strenuous activities and exercise should be approached gradually.  You may shower as long as you keep the incision dry for the first 4 days.              The incision should not be immersed in water until it is completely healed. No baths or swimming until healed.  Most of the sutures will be absorbed over the next few weeks.  If you have                     sutures that need to be removed, we will arrange this with you. If you can see            any sutures 4 weeks after the port was placed, please call us and we will  remove them     Caring for the Device:  When not in use, your nurse will flush the catheter monthly with a heparinized saline solution to prevent the catheter from occluding. When in use, regular needle changes and cleaning of the skin around the portal are necessary and will be performed by your nurse.     You may contact [facility] for any questions or problems that may occur at [phone] during the hours of

## 2024-10-07 LAB — INR BLD: 1.4 (ref 0.84–1.16)

## 2024-12-20 ENCOUNTER — HOSPITAL ENCOUNTER (OUTPATIENT)
Dept: CT IMAGING | Age: 59
Discharge: HOME | End: 2024-12-20
Payer: COMMERCIAL

## 2024-12-20 ENCOUNTER — HOSPITAL ENCOUNTER (OUTPATIENT)
Dept: NUCLEAR MEDICINE | Age: 59
Discharge: HOME | End: 2024-12-20
Payer: COMMERCIAL

## 2024-12-20 DIAGNOSIS — C50.819 MALIGNANT NEOPLASM OF MIDLINE OF BREAST, UNSPECIFIED LATERALITY (HCC): ICD-10-CM

## 2024-12-20 LAB
PERFORMED ON: ABNORMAL
POC CREATININE: 1.1 MG/DL (ref 0.6–1.2)
POC SAMPLE TYPE: ABNORMAL

## 2024-12-20 PROCEDURE — 78306 BONE IMAGING WHOLE BODY: CPT

## 2024-12-20 PROCEDURE — 3430000000 HC RX DIAGNOSTIC RADIOPHARMACEUTICAL

## 2024-12-20 PROCEDURE — 6360000004 HC RX CONTRAST MEDICATION

## 2024-12-20 PROCEDURE — 82565 ASSAY OF CREATININE: CPT

## 2024-12-20 PROCEDURE — 74177 CT ABD & PELVIS W/CONTRAST: CPT

## 2024-12-20 PROCEDURE — A9503 TC99M MEDRONATE: HCPCS

## 2024-12-20 RX ORDER — TC 99M MEDRONATE 20 MG/10ML
26 INJECTION, POWDER, LYOPHILIZED, FOR SOLUTION INTRAVENOUS
Status: COMPLETED | OUTPATIENT
Start: 2024-12-20 | End: 2024-12-20

## 2024-12-20 RX ORDER — IOPAMIDOL 755 MG/ML
75 INJECTION, SOLUTION INTRAVASCULAR
Status: COMPLETED | OUTPATIENT
Start: 2024-12-20 | End: 2024-12-20

## 2024-12-20 RX ORDER — DIATRIZOATE MEGLUMINE AND DIATRIZOATE SODIUM 660; 100 MG/ML; MG/ML
12 SOLUTION ORAL; RECTAL
Status: DISCONTINUED | OUTPATIENT
Start: 2024-12-20 | End: 2024-12-21 | Stop reason: HOSPADM

## 2024-12-20 RX ADMIN — TC 99M MEDRONATE 26 MILLICURIE: 20 INJECTION, POWDER, LYOPHILIZED, FOR SOLUTION INTRAVENOUS at 09:05

## 2024-12-20 RX ADMIN — DIATRIZOATE MEGLUMINE AND DIATRIZOATE SODIUM 12 ML: 660; 100 LIQUID ORAL; RECTAL at 09:41

## 2024-12-20 RX ADMIN — IOPAMIDOL 75 ML: 755 INJECTION, SOLUTION INTRAVENOUS at 09:41

## 2025-06-19 ENCOUNTER — HOSPITAL ENCOUNTER (OUTPATIENT)
Dept: CT IMAGING | Age: 60
Discharge: HOME | End: 2025-06-19
Payer: COMMERCIAL

## 2025-06-19 DIAGNOSIS — C50.819 MALIGNANT NEOPLASM OF MIDLINE OF BREAST, UNSPECIFIED LATERALITY (HCC): ICD-10-CM

## 2025-06-19 LAB
PERFORMED ON: ABNORMAL
POC CREATININE: 1.3 MG/DL (ref 0.6–1.2)
POC SAMPLE TYPE: ABNORMAL

## 2025-06-19 PROCEDURE — 74177 CT ABD & PELVIS W/CONTRAST: CPT

## 2025-06-19 PROCEDURE — 82565 ASSAY OF CREATININE: CPT

## 2025-06-19 PROCEDURE — 6360000004 HC RX CONTRAST MEDICATION: Performed by: NURSE PRACTITIONER

## 2025-06-19 RX ORDER — IOPAMIDOL 755 MG/ML
75 INJECTION, SOLUTION INTRAVASCULAR
Status: COMPLETED | OUTPATIENT
Start: 2025-06-19 | End: 2025-06-19

## 2025-06-19 RX ORDER — DIATRIZOATE MEGLUMINE AND DIATRIZOATE SODIUM 660; 100 MG/ML; MG/ML
12 SOLUTION ORAL; RECTAL
Status: DISCONTINUED | OUTPATIENT
Start: 2025-06-19 | End: 2025-06-20 | Stop reason: HOSPADM

## 2025-06-19 RX ADMIN — DIATRIZOATE MEGLUMINE AND DIATRIZOATE SODIUM 12 ML: 660; 100 LIQUID ORAL; RECTAL at 18:16

## 2025-06-19 RX ADMIN — IOPAMIDOL 75 ML: 755 INJECTION, SOLUTION INTRAVENOUS at 18:10

## 2025-06-19 NOTE — PROGRESS NOTES
Port accessed for CT. Pt was identified using two unique identifiers. Allergies were noted. Sterile procedures followed to access port. RTsided port was accessed, aspirated and flushed approprirately. Sterile dressing applied.

## 2025-06-19 NOTE — PROGRESS NOTES
sPort deaccessed for CT. Pt was identified using two unique identifiers. Allergies were noted. RT sided port was deaccessed and then dry dressing was applied to the site.

## (undated) DEVICE — SYRINGE MED 10ML LUERLOCK TIP W/O SFTY DISP

## (undated) DEVICE — CONTAINER,SPECIMEN,PNEU TUBE,3OZ,OR STRL: Brand: MEDLINE

## (undated) DEVICE — ELECTROSURGICAL PENCIL ROCKER SWITCH NON COATED BLADE ELECTRODE 10 FT (3 M) CORD HOLSTER: Brand: MEGADYNE

## (undated) DEVICE — MEDICINE CUP, GRADUATED, STER: Brand: MEDLINE

## (undated) DEVICE — COVER LT HNDL BLU PLAS

## (undated) DEVICE — SURGICAL SET UP - SURE SET: Brand: MEDLINE INDUSTRIES, INC.

## (undated) DEVICE — JEWISH HOSPITAL TURNOVER KIT: Brand: MEDLINE INDUSTRIES, INC.

## (undated) DEVICE — INTENDED USE FOR SURGICAL MARKING ON INTACT SKIN, ALSO PROVIDES A PERMANENT METHOD OF IDENTIFYING OBJECTS IN THE OPERATING ROOM: Brand: WRITESITE® PLUS MINI PREP RESISTANT MARKER

## (undated) DEVICE — 12 ML SYRINGE,LUER-LOCK TIP: Brand: MONOJECT

## (undated) DEVICE — SUTURE VCRL SZ 3-0 L27IN ABSRB UD L26MM SH 1/2 CIR J416H

## (undated) DEVICE — SYRINGE 20ML LL S/C 50

## (undated) DEVICE — STANDARD HYPODERMIC NEEDLE,POLYPROPYLENE HUB: Brand: MONOJECT

## (undated) DEVICE — GARMENT,MEDLINE,DVT,INT,CALF,MED, GEN2: Brand: MEDLINE

## (undated) DEVICE — ADHESIVE SKIN CLSR 0.7ML TOP DERMBND ADV

## (undated) DEVICE — STERILE LATEX POWDER-FREE SURGICAL GLOVESWITH NITRILE COATING: Brand: PROTEXIS

## (undated) DEVICE — DRAPE C ARM UNIV W41XL74IN CLR PLAS XR VELC CLSR POLY STRP

## (undated) DEVICE — BOWL MED L 32OZ PLAS W/ MOLD GRAD EZ OPN PEEL PCH

## (undated) DEVICE — DRAPE,T,LAPARO,TRANS,STERILE: Brand: MEDLINE

## (undated) DEVICE — GOWN,SIRUS,POLYRNF,BRTHSLV,LG,30/CS: Brand: MEDLINE

## (undated) DEVICE — SUTURE PERMA-HAND SZ 2-0 L30IN NONABSORBABLE BLK L26MM SH K833H

## (undated) DEVICE — STERILE POLYISOPRENE POWDER-FREE SURGICAL GLOVES WITH EMOLLIENT COATING: Brand: PROTEXIS

## (undated) DEVICE — SUTURE ABSORBABLE BRAIDED 4-0 P-3 18 IN UD VICRYL J494G

## (undated) DEVICE — PLATE ES AD W 9FT CRD 2

## (undated) DEVICE — APPLICATOR MEDICATED 26 CC SOLUTION HI LT ORNG CHLORAPREP